# Patient Record
Sex: MALE | Race: WHITE | Employment: OTHER | URBAN - METROPOLITAN AREA
[De-identification: names, ages, dates, MRNs, and addresses within clinical notes are randomized per-mention and may not be internally consistent; named-entity substitution may affect disease eponyms.]

---

## 2017-03-06 RX ORDER — ROSUVASTATIN CALCIUM 10 MG/1
TABLET, COATED ORAL
Qty: 30 TABLET | Refills: 11 | Status: SHIPPED | OUTPATIENT
Start: 2017-03-06 | End: 2017-05-02

## 2017-05-02 ENCOUNTER — OFFICE VISIT (OUTPATIENT)
Dept: INTERNAL MEDICINE CLINIC | Age: 82
End: 2017-05-02

## 2017-05-02 VITALS
RESPIRATION RATE: 16 BRPM | DIASTOLIC BLOOD PRESSURE: 60 MMHG | TEMPERATURE: 98.5 F | HEART RATE: 74 BPM | SYSTOLIC BLOOD PRESSURE: 118 MMHG | OXYGEN SATURATION: 93 % | BODY MASS INDEX: 22.33 KG/M2 | WEIGHT: 174 LBS | HEIGHT: 74 IN

## 2017-05-02 DIAGNOSIS — J40 BRONCHITIS: Primary | ICD-10-CM

## 2017-05-02 PROCEDURE — 1123F ACP DISCUSS/DSCN MKR DOCD: CPT | Performed by: INTERNAL MEDICINE

## 2017-05-02 PROCEDURE — 1036F TOBACCO NON-USER: CPT | Performed by: INTERNAL MEDICINE

## 2017-05-02 PROCEDURE — G8419 CALC BMI OUT NRM PARAM NOF/U: HCPCS | Performed by: INTERNAL MEDICINE

## 2017-05-02 PROCEDURE — 99213 OFFICE O/P EST LOW 20 MIN: CPT | Performed by: INTERNAL MEDICINE

## 2017-05-02 PROCEDURE — 4040F PNEUMOC VAC/ADMIN/RCVD: CPT | Performed by: INTERNAL MEDICINE

## 2017-05-02 PROCEDURE — G8427 DOCREV CUR MEDS BY ELIG CLIN: HCPCS | Performed by: INTERNAL MEDICINE

## 2017-05-02 RX ORDER — DOXYCYCLINE HYCLATE 100 MG
100 TABLET ORAL 2 TIMES DAILY
Qty: 20 TABLET | Refills: 0 | Status: SHIPPED | OUTPATIENT
Start: 2017-05-02 | End: 2017-05-12

## 2017-05-02 RX ORDER — AZITHROMYCIN 250 MG/1
TABLET, FILM COATED ORAL
Qty: 1 PACKET | Refills: 0 | Status: SHIPPED | OUTPATIENT
Start: 2017-05-02 | End: 2017-05-02 | Stop reason: SINTOL

## 2017-05-02 ASSESSMENT — ENCOUNTER SYMPTOMS
COUGH: 1
CHEST TIGHTNESS: 0
TROUBLE SWALLOWING: 1
WHEEZING: 0
SHORTNESS OF BREATH: 0
SORE THROAT: 1

## 2017-05-05 RX ORDER — VALSARTAN AND HYDROCHLOROTHIAZIDE 160; 25 MG/1; MG/1
TABLET ORAL
Qty: 30 TABLET | Refills: 2 | Status: SHIPPED | OUTPATIENT
Start: 2017-05-05 | End: 2017-08-08 | Stop reason: SDUPTHER

## 2017-08-08 RX ORDER — VALSARTAN AND HYDROCHLOROTHIAZIDE 160; 25 MG/1; MG/1
TABLET ORAL
Qty: 30 TABLET | Refills: 5 | Status: SHIPPED | OUTPATIENT
Start: 2017-08-08 | End: 2018-02-02 | Stop reason: SDUPTHER

## 2017-08-08 RX ORDER — VALSARTAN AND HYDROCHLOROTHIAZIDE 160; 25 MG/1; MG/1
TABLET ORAL
Qty: 90 TABLET | Refills: 1 | Status: SHIPPED | OUTPATIENT
Start: 2017-08-08 | End: 2017-10-23

## 2017-09-25 ENCOUNTER — NURSE ONLY (OUTPATIENT)
Dept: INTERNAL MEDICINE CLINIC | Age: 82
End: 2017-09-25

## 2017-09-25 DIAGNOSIS — Z23 INFLUENZA VACCINE ADMINISTERED: Primary | ICD-10-CM

## 2017-09-25 PROCEDURE — G0008 ADMIN INFLUENZA VIRUS VAC: HCPCS | Performed by: INTERNAL MEDICINE

## 2017-09-25 PROCEDURE — 90662 IIV NO PRSV INCREASED AG IM: CPT | Performed by: INTERNAL MEDICINE

## 2017-10-23 ENCOUNTER — OFFICE VISIT (OUTPATIENT)
Dept: INTERNAL MEDICINE CLINIC | Age: 82
End: 2017-10-23

## 2017-10-23 VITALS
TEMPERATURE: 98.5 F | WEIGHT: 172 LBS | SYSTOLIC BLOOD PRESSURE: 136 MMHG | HEART RATE: 82 BPM | BODY MASS INDEX: 22.08 KG/M2 | DIASTOLIC BLOOD PRESSURE: 82 MMHG | RESPIRATION RATE: 16 BRPM

## 2017-10-23 DIAGNOSIS — K59.01 SLOW TRANSIT CONSTIPATION: Primary | ICD-10-CM

## 2017-10-23 PROCEDURE — 1036F TOBACCO NON-USER: CPT | Performed by: INTERNAL MEDICINE

## 2017-10-23 PROCEDURE — G8484 FLU IMMUNIZE NO ADMIN: HCPCS | Performed by: INTERNAL MEDICINE

## 2017-10-23 PROCEDURE — 4040F PNEUMOC VAC/ADMIN/RCVD: CPT | Performed by: INTERNAL MEDICINE

## 2017-10-23 PROCEDURE — G8420 CALC BMI NORM PARAMETERS: HCPCS | Performed by: INTERNAL MEDICINE

## 2017-10-23 PROCEDURE — G8427 DOCREV CUR MEDS BY ELIG CLIN: HCPCS | Performed by: INTERNAL MEDICINE

## 2017-10-23 PROCEDURE — 1123F ACP DISCUSS/DSCN MKR DOCD: CPT | Performed by: INTERNAL MEDICINE

## 2017-10-23 PROCEDURE — 99213 OFFICE O/P EST LOW 20 MIN: CPT | Performed by: INTERNAL MEDICINE

## 2017-10-23 ASSESSMENT — PATIENT HEALTH QUESTIONNAIRE - PHQ9
1. LITTLE INTEREST OR PLEASURE IN DOING THINGS: 0
2. FEELING DOWN, DEPRESSED OR HOPELESS: 0
SUM OF ALL RESPONSES TO PHQ QUESTIONS 1-9: 0
SUM OF ALL RESPONSES TO PHQ9 QUESTIONS 1 & 2: 0

## 2017-10-23 ASSESSMENT — ENCOUNTER SYMPTOMS
ABDOMINAL PAIN: 0
DIARRHEA: 0
CONSTIPATION: 1
ABDOMINAL DISTENTION: 0
ANAL BLEEDING: 0
RECTAL PAIN: 0
BLOOD IN STOOL: 0
NAUSEA: 0

## 2017-10-23 NOTE — PROGRESS NOTES
Subjective:      Patient ID: Cristobal Cortez is a 80 y.o. male. [facility]   Robbin Hackett is here for constipation. Constipation - for 6 weeks. Last colonoscopy in 9/14 and \"clear'. Has some diverticulosis. Benefiber and Miralax stopped because of \"gas\". Straining to have bowel movement. Now eating more dietary fiber and some better. Review of Systems   Gastrointestinal: Positive for constipation. Negative for abdominal distention, abdominal pain, anal bleeding, blood in stool, diarrhea, nausea and rectal pain. Objective:   Physical Exam   Abdominal: Soft. Bowel sounds are normal. He exhibits no distension and no mass. There is no tenderness. There is no rebound and no guarding. Assessment:      1. Slow transit constipation            Plan:      Ana María Blank was seen today for constipation. Diagnoses and all orders for this visit:    Slow transit constipation       - Continue high fiber; Linzess if no improvement.

## 2018-01-10 RX ORDER — ROSUVASTATIN CALCIUM 10 MG/1
TABLET, COATED ORAL
Qty: 90 TABLET | Refills: 3 | Status: SHIPPED | OUTPATIENT
Start: 2018-01-10 | End: 2018-02-02 | Stop reason: SDUPTHER

## 2018-02-27 ENCOUNTER — OFFICE VISIT (OUTPATIENT)
Dept: INTERNAL MEDICINE CLINIC | Age: 83
End: 2018-02-27

## 2018-02-27 VITALS
DIASTOLIC BLOOD PRESSURE: 70 MMHG | RESPIRATION RATE: 14 BRPM | SYSTOLIC BLOOD PRESSURE: 134 MMHG | OXYGEN SATURATION: 98 % | WEIGHT: 170 LBS | BODY MASS INDEX: 21.82 KG/M2 | HEART RATE: 80 BPM | HEIGHT: 74 IN

## 2018-02-27 DIAGNOSIS — E78.2 MIXED HYPERLIPIDEMIA: ICD-10-CM

## 2018-02-27 DIAGNOSIS — H61.23 CERUMEN DEBRIS ON TYMPANIC MEMBRANE OF BOTH EARS: ICD-10-CM

## 2018-02-27 DIAGNOSIS — I10 ESSENTIAL HYPERTENSION: Primary | ICD-10-CM

## 2018-02-27 DIAGNOSIS — F41.1 GENERALIZED ANXIETY DISORDER: ICD-10-CM

## 2018-02-27 LAB
A/G RATIO: 1.9 (ref 1.1–2.2)
ALBUMIN SERPL-MCNC: 4.6 G/DL (ref 3.4–5)
ALP BLD-CCNC: 63 U/L (ref 40–129)
ALT SERPL-CCNC: 18 U/L (ref 10–40)
ANION GAP SERPL CALCULATED.3IONS-SCNC: 16 MMOL/L (ref 3–16)
AST SERPL-CCNC: 18 U/L (ref 15–37)
BILIRUB SERPL-MCNC: 0.9 MG/DL (ref 0–1)
BUN BLDV-MCNC: 14 MG/DL (ref 7–20)
CALCIUM SERPL-MCNC: 9.3 MG/DL (ref 8.3–10.6)
CHLORIDE BLD-SCNC: 103 MMOL/L (ref 99–110)
CHOLESTEROL, TOTAL: 144 MG/DL (ref 0–199)
CO2: 24 MMOL/L (ref 21–32)
CREAT SERPL-MCNC: 1.1 MG/DL (ref 0.8–1.3)
GFR AFRICAN AMERICAN: >60
GFR NON-AFRICAN AMERICAN: >60
GLOBULIN: 2.4 G/DL
GLUCOSE BLD-MCNC: 106 MG/DL (ref 70–99)
HDLC SERPL-MCNC: 43 MG/DL (ref 40–60)
LDL CHOLESTEROL CALCULATED: 81 MG/DL
POTASSIUM SERPL-SCNC: 4.3 MMOL/L (ref 3.5–5.1)
SODIUM BLD-SCNC: 143 MMOL/L (ref 136–145)
TOTAL PROTEIN: 7 G/DL (ref 6.4–8.2)
TRIGL SERPL-MCNC: 101 MG/DL (ref 0–150)
VLDLC SERPL CALC-MCNC: 20 MG/DL

## 2018-02-27 PROCEDURE — G8420 CALC BMI NORM PARAMETERS: HCPCS | Performed by: INTERNAL MEDICINE

## 2018-02-27 PROCEDURE — G8484 FLU IMMUNIZE NO ADMIN: HCPCS | Performed by: INTERNAL MEDICINE

## 2018-02-27 PROCEDURE — 99214 OFFICE O/P EST MOD 30 MIN: CPT | Performed by: INTERNAL MEDICINE

## 2018-02-27 PROCEDURE — 4040F PNEUMOC VAC/ADMIN/RCVD: CPT | Performed by: INTERNAL MEDICINE

## 2018-02-27 PROCEDURE — 1036F TOBACCO NON-USER: CPT | Performed by: INTERNAL MEDICINE

## 2018-02-27 PROCEDURE — 36415 COLL VENOUS BLD VENIPUNCTURE: CPT | Performed by: INTERNAL MEDICINE

## 2018-02-27 PROCEDURE — G8427 DOCREV CUR MEDS BY ELIG CLIN: HCPCS | Performed by: INTERNAL MEDICINE

## 2018-02-27 PROCEDURE — 1123F ACP DISCUSS/DSCN MKR DOCD: CPT | Performed by: INTERNAL MEDICINE

## 2018-02-27 ASSESSMENT — ENCOUNTER SYMPTOMS
RESPIRATORY NEGATIVE: 1
GASTROINTESTINAL NEGATIVE: 1

## 2018-02-27 NOTE — PROGRESS NOTES
Subjective:      Patient ID: Anika Bryant is a 80 y.o. male. HPI  Here for follow-up of HTN, HL and anxiety disorder. HTN - The patient denies any chest pain, shortness or breath, headaches or palpitations. There is no lower extremity edema. Continues to use the medication as prescribed (Kamran Black) and is having no side effects. HL - on Crestor and no side effects. No recent lipid profile. Anxiety disorder - on no current medications. Overall nervousness seems better. Review of Systems   Constitutional: Negative. HENT: Positive for hearing loss. Respiratory: Negative. Cardiovascular: Negative. Gastrointestinal: Negative. Genitourinary:        Sees urology annually. PSA < 1. Musculoskeletal: Negative. Neurological: Negative. Psychiatric/Behavioral: The patient is nervous/anxious. 14 point ros otherwise negative. Objective:   Physical Exam   Constitutional: He is oriented to person, place, and time. He appears well-developed and well-nourished. HENT:   Cerumen bilateral.   Eyes: EOM are normal. Pupils are equal, round, and reactive to light. Neck: No JVD present. Cardiovascular: Normal rate, regular rhythm and normal heart sounds. Pulmonary/Chest: Effort normal and breath sounds normal. No respiratory distress. He has no wheezes. He has no rales. Abdominal: Soft. Bowel sounds are normal. He exhibits no distension. There is no tenderness. Musculoskeletal: He exhibits no edema. Neurological: He is alert and oriented to person, place, and time. Skin: Skin is warm and dry. Assessment:      1. Essential hypertension    2. Mixed hyperlipidemia    3. Generalized anxiety disorder    4. Cerumen debris on tympanic membrane of both ears            Plan:      Sheila Mann was seen today for medication check.     Diagnoses and all orders for this visit:    Essential hypertension, controlled        - Continue Valsartan-Hctz  -     Comprehensive Metabolic Panel    Mixed hyperlipidemia        - Continue Crestor  -     Lipid Panel    Generalized anxiety disorder, controlled        - No treatment needed. Cerumen debris on tympanic membrane of both ears        - Partially removed with curette.

## 2018-03-02 RX ORDER — VALSARTAN AND HYDROCHLOROTHIAZIDE 160; 25 MG/1; MG/1
TABLET ORAL
Qty: 30 TABLET | Refills: 11 | Status: SHIPPED | OUTPATIENT
Start: 2018-03-02 | End: 2018-11-26 | Stop reason: ALTCHOICE

## 2018-09-25 ENCOUNTER — NURSE ONLY (OUTPATIENT)
Dept: INTERNAL MEDICINE CLINIC | Age: 83
End: 2018-09-25
Payer: MEDICARE

## 2018-09-25 DIAGNOSIS — R73.09 ELEVATED GLUCOSE: Primary | ICD-10-CM

## 2018-09-25 LAB
ANION GAP SERPL CALCULATED.3IONS-SCNC: 15 MMOL/L (ref 3–16)
BUN BLDV-MCNC: 17 MG/DL (ref 7–20)
CALCIUM SERPL-MCNC: 9.5 MG/DL (ref 8.3–10.6)
CHLORIDE BLD-SCNC: 103 MMOL/L (ref 99–110)
CO2: 22 MMOL/L (ref 21–32)
CREAT SERPL-MCNC: 1.1 MG/DL (ref 0.8–1.3)
GFR AFRICAN AMERICAN: >60
GFR NON-AFRICAN AMERICAN: >60
GLUCOSE BLD-MCNC: 95 MG/DL (ref 70–99)
POTASSIUM SERPL-SCNC: 4.8 MMOL/L (ref 3.5–5.1)
SODIUM BLD-SCNC: 140 MMOL/L (ref 136–145)

## 2018-09-25 PROCEDURE — 90662 IIV NO PRSV INCREASED AG IM: CPT | Performed by: INTERNAL MEDICINE

## 2018-09-25 PROCEDURE — 36415 COLL VENOUS BLD VENIPUNCTURE: CPT | Performed by: INTERNAL MEDICINE

## 2018-09-25 PROCEDURE — G0008 ADMIN INFLUENZA VIRUS VAC: HCPCS | Performed by: INTERNAL MEDICINE

## 2018-10-07 ENCOUNTER — APPOINTMENT (OUTPATIENT)
Dept: GENERAL RADIOLOGY | Age: 83
DRG: 641 | End: 2018-10-07
Payer: MEDICARE

## 2018-10-07 ENCOUNTER — HOSPITAL ENCOUNTER (INPATIENT)
Age: 83
LOS: 2 days | Discharge: HOME | DRG: 641 | End: 2018-10-09
Attending: EMERGENCY MEDICINE | Admitting: INTERNAL MEDICINE
Payer: MEDICARE

## 2018-10-07 ENCOUNTER — APPOINTMENT (OUTPATIENT)
Dept: CT IMAGING | Age: 83
DRG: 641 | End: 2018-10-07
Payer: MEDICARE

## 2018-10-07 DIAGNOSIS — R55 PRE-SYNCOPE: Primary | ICD-10-CM

## 2018-10-07 DIAGNOSIS — I49.3 PVC (PREMATURE VENTRICULAR CONTRACTION): ICD-10-CM

## 2018-10-07 DIAGNOSIS — R20.0 LEFT LEG NUMBNESS: ICD-10-CM

## 2018-10-07 LAB
A/G RATIO: 1.7 (ref 1.1–2.2)
ALBUMIN SERPL-MCNC: 4.2 G/DL (ref 3.4–5)
ALP BLD-CCNC: 54 U/L (ref 40–129)
ALT SERPL-CCNC: 21 U/L (ref 10–40)
ANION GAP SERPL CALCULATED.3IONS-SCNC: 16 MMOL/L (ref 3–16)
AST SERPL-CCNC: 21 U/L (ref 15–37)
BASOPHILS ABSOLUTE: 0.1 K/UL (ref 0–0.2)
BASOPHILS RELATIVE PERCENT: 1 %
BILIRUB SERPL-MCNC: 0.5 MG/DL (ref 0–1)
BILIRUBIN URINE: NEGATIVE
BLOOD, URINE: NEGATIVE
BUN BLDV-MCNC: 18 MG/DL (ref 7–20)
CALCIUM SERPL-MCNC: 9.1 MG/DL (ref 8.3–10.6)
CHLORIDE BLD-SCNC: 102 MMOL/L (ref 99–110)
CLARITY: CLEAR
CO2: 21 MMOL/L (ref 21–32)
COLOR: YELLOW
CREAT SERPL-MCNC: 1.2 MG/DL (ref 0.8–1.3)
EOSINOPHILS ABSOLUTE: 0.1 K/UL (ref 0–0.6)
EOSINOPHILS RELATIVE PERCENT: 1.8 %
GFR AFRICAN AMERICAN: >60
GFR NON-AFRICAN AMERICAN: 58
GLOBULIN: 2.5 G/DL
GLUCOSE BLD-MCNC: 134 MG/DL (ref 70–99)
GLUCOSE URINE: NEGATIVE MG/DL
HCT VFR BLD CALC: 37.8 % (ref 40.5–52.5)
HEMOGLOBIN: 12.8 G/DL (ref 13.5–17.5)
KETONES, URINE: NEGATIVE MG/DL
LEUKOCYTE ESTERASE, URINE: NEGATIVE
LYMPHOCYTES ABSOLUTE: 2 K/UL (ref 1–5.1)
LYMPHOCYTES RELATIVE PERCENT: 29.4 %
MAGNESIUM: 1.8 MG/DL (ref 1.8–2.4)
MCH RBC QN AUTO: 32.8 PG (ref 26–34)
MCHC RBC AUTO-ENTMCNC: 34 G/DL (ref 31–36)
MCV RBC AUTO: 96.4 FL (ref 80–100)
MICROSCOPIC EXAMINATION: NORMAL
MONOCYTES ABSOLUTE: 0.6 K/UL (ref 0–1.3)
MONOCYTES RELATIVE PERCENT: 8.3 %
NEUTROPHILS ABSOLUTE: 4 K/UL (ref 1.7–7.7)
NEUTROPHILS RELATIVE PERCENT: 59.5 %
NITRITE, URINE: NEGATIVE
PDW BLD-RTO: 13.5 % (ref 12.4–15.4)
PH UA: 7
PLATELET # BLD: 121 K/UL (ref 135–450)
PMV BLD AUTO: 9.7 FL (ref 5–10.5)
POTASSIUM REFLEX MAGNESIUM: 4.2 MMOL/L (ref 3.5–5.1)
PRO-BNP: 218 PG/ML (ref 0–449)
PROTEIN UA: NEGATIVE MG/DL
RBC # BLD: 3.92 M/UL (ref 4.2–5.9)
SODIUM BLD-SCNC: 139 MMOL/L (ref 136–145)
SPECIFIC GRAVITY UA: 1.01
TOTAL PROTEIN: 6.7 G/DL (ref 6.4–8.2)
TROPONIN: 0.01 NG/ML
URINE REFLEX TO CULTURE: NORMAL
URINE TYPE: NORMAL
UROBILINOGEN, URINE: 0.2 E.U./DL
WBC # BLD: 6.7 K/UL (ref 4–11)

## 2018-10-07 PROCEDURE — 70450 CT HEAD/BRAIN W/O DYE: CPT

## 2018-10-07 PROCEDURE — 1200000000 HC SEMI PRIVATE

## 2018-10-07 PROCEDURE — 71045 X-RAY EXAM CHEST 1 VIEW: CPT

## 2018-10-07 PROCEDURE — 93010 ELECTROCARDIOGRAM REPORT: CPT | Performed by: INTERNAL MEDICINE

## 2018-10-07 PROCEDURE — 80053 COMPREHEN METABOLIC PANEL: CPT

## 2018-10-07 PROCEDURE — 71250 CT THORAX DX C-: CPT

## 2018-10-07 PROCEDURE — 2580000003 HC RX 258: Performed by: INTERNAL MEDICINE

## 2018-10-07 PROCEDURE — 83735 ASSAY OF MAGNESIUM: CPT

## 2018-10-07 PROCEDURE — 83880 ASSAY OF NATRIURETIC PEPTIDE: CPT

## 2018-10-07 PROCEDURE — 93005 ELECTROCARDIOGRAM TRACING: CPT | Performed by: EMERGENCY MEDICINE

## 2018-10-07 PROCEDURE — 81003 URINALYSIS AUTO W/O SCOPE: CPT

## 2018-10-07 PROCEDURE — 6370000000 HC RX 637 (ALT 250 FOR IP): Performed by: INTERNAL MEDICINE

## 2018-10-07 PROCEDURE — 85025 COMPLETE CBC W/AUTO DIFF WBC: CPT

## 2018-10-07 PROCEDURE — 36415 COLL VENOUS BLD VENIPUNCTURE: CPT

## 2018-10-07 PROCEDURE — 99285 EMERGENCY DEPT VISIT HI MDM: CPT

## 2018-10-07 PROCEDURE — 84484 ASSAY OF TROPONIN QUANT: CPT

## 2018-10-07 PROCEDURE — 74176 CT ABD & PELVIS W/O CONTRAST: CPT

## 2018-10-07 RX ORDER — ONDANSETRON 2 MG/ML
4 INJECTION INTRAMUSCULAR; INTRAVENOUS EVERY 6 HOURS PRN
Status: DISCONTINUED | OUTPATIENT
Start: 2018-10-07 | End: 2018-10-09 | Stop reason: HOSPADM

## 2018-10-07 RX ORDER — SODIUM CHLORIDE 0.9 % (FLUSH) 0.9 %
10 SYRINGE (ML) INJECTION PRN
Status: DISCONTINUED | OUTPATIENT
Start: 2018-10-07 | End: 2018-10-07 | Stop reason: SDUPTHER

## 2018-10-07 RX ORDER — PANTOPRAZOLE SODIUM 40 MG/1
40 TABLET, DELAYED RELEASE ORAL
Status: DISCONTINUED | OUTPATIENT
Start: 2018-10-08 | End: 2018-10-09 | Stop reason: HOSPADM

## 2018-10-07 RX ORDER — ASPIRIN 325 MG
325 TABLET ORAL DAILY
Status: DISCONTINUED | OUTPATIENT
Start: 2018-10-08 | End: 2018-10-09 | Stop reason: HOSPADM

## 2018-10-07 RX ORDER — SODIUM CHLORIDE 0.9 % (FLUSH) 0.9 %
10 SYRINGE (ML) INJECTION EVERY 12 HOURS SCHEDULED
Status: DISCONTINUED | OUTPATIENT
Start: 2018-10-07 | End: 2018-10-07 | Stop reason: SDUPTHER

## 2018-10-07 RX ORDER — SODIUM CHLORIDE 0.9 % (FLUSH) 0.9 %
10 SYRINGE (ML) INJECTION PRN
Status: DISCONTINUED | OUTPATIENT
Start: 2018-10-07 | End: 2018-10-09 | Stop reason: HOSPADM

## 2018-10-07 RX ORDER — ONDANSETRON 2 MG/ML
4 INJECTION INTRAMUSCULAR; INTRAVENOUS EVERY 6 HOURS PRN
Status: DISCONTINUED | OUTPATIENT
Start: 2018-10-07 | End: 2018-10-07 | Stop reason: SDUPTHER

## 2018-10-07 RX ORDER — SODIUM CHLORIDE 0.9 % (FLUSH) 0.9 %
10 SYRINGE (ML) INJECTION EVERY 12 HOURS SCHEDULED
Status: DISCONTINUED | OUTPATIENT
Start: 2018-10-07 | End: 2018-10-09 | Stop reason: HOSPADM

## 2018-10-07 RX ORDER — ATORVASTATIN CALCIUM 40 MG/1
40 TABLET, FILM COATED ORAL NIGHTLY
Status: DISCONTINUED | OUTPATIENT
Start: 2018-10-07 | End: 2018-10-09 | Stop reason: HOSPADM

## 2018-10-07 RX ORDER — VALSARTAN AND HYDROCHLOROTHIAZIDE 160; 25 MG/1; MG/1
1 TABLET ORAL DAILY
Status: DISCONTINUED | OUTPATIENT
Start: 2018-10-08 | End: 2018-10-09 | Stop reason: HOSPADM

## 2018-10-07 RX ADMIN — ATORVASTATIN CALCIUM 40 MG: 40 TABLET, FILM COATED ORAL at 22:55

## 2018-10-07 RX ADMIN — Medication 10 ML: at 22:56

## 2018-10-07 ASSESSMENT — PAIN DESCRIPTION - ORIENTATION: ORIENTATION: LEFT

## 2018-10-07 ASSESSMENT — PAIN DESCRIPTION - LOCATION: LOCATION: SHOULDER

## 2018-10-07 ASSESSMENT — PAIN DESCRIPTION - PAIN TYPE: TYPE: CHRONIC PAIN

## 2018-10-07 ASSESSMENT — PAIN SCALES - GENERAL: PAINLEVEL_OUTOF10: 4

## 2018-10-07 NOTE — ED PROVIDER NOTES
1545 10/07/18 1600 10/07/18 1615 10/07/18 1630   BP: 133/64 (!) 128/56 (!) 127/59 125/69   Pulse: 85 82 81 80   Resp: 18 20 18 14   Temp:       TempSrc:       SpO2: 100% 100% 100% 100%   Weight:       Height:             MDM  80-year-old male presents to the ER with complaint of intermittent episodes of lightheadedness, left leg numbness, palpitations. Vital signs stable. Physical exam as above. His neurological exam is intact at this time. He does have frequent PVCs on the monitor and his EKG. No other ischemic changes. Will get cardiac workup, CT head and admit for further presyncopal evaluation and evaluation of left leg numbness. Patient's lab work within normal limits. He continues to have occasional PVCs on the monitor. CT head within normal limits. Chest x-ray with concern for pneumothorax or pneumoperitoneum. CT chest and abdomen ordered and no acute abnormality. No pneumothorax or pneumoperitoneum. Discussed with patient and patient's primary care physician. We'll transfer to [facility] for inpatient admission and evaluation of PVCs and presyncope. CONSULTS:  None      FINAL IMPRESSION      1. Pre-syncope    2. Left leg numbness    3. PVC (premature ventricular contraction)          DISPOSITION/PLAN   DISPOSITION Decision To Transfer 10/07/2018 04:22:06 PM      PATIENT REFERRED TO:  No follow-up provider specified.     DISCHARGE MEDICATIONS:  New Prescriptions    No medications on file          (Please note that portions of this note were completed with a voice recognition program.  Efforts were made to edit the dictations but occasionally words are mis-transcribed.)    Xiomara Ferris DO (electronically signed)  Attending Emergency Physician      Xiomara Ferris DO  10/07/18 [id number]

## 2018-10-08 ENCOUNTER — TELEPHONE (OUTPATIENT)
Dept: CARDIOLOGY CLINIC | Age: 83
End: 2018-10-08

## 2018-10-08 DIAGNOSIS — R55 PRE-SYNCOPE: Primary | ICD-10-CM

## 2018-10-08 LAB
ANION GAP SERPL CALCULATED.3IONS-SCNC: 13 MMOL/L (ref 3–16)
BASOPHILS ABSOLUTE: 0 K/UL (ref 0–0.2)
BASOPHILS RELATIVE PERCENT: 0.5 %
BUN BLDV-MCNC: 14 MG/DL (ref 7–20)
CALCIUM SERPL-MCNC: 9 MG/DL (ref 8.3–10.6)
CHLORIDE BLD-SCNC: 100 MMOL/L (ref 99–110)
CO2: 22 MMOL/L (ref 21–32)
CREAT SERPL-MCNC: 1 MG/DL (ref 0.8–1.3)
EOSINOPHILS ABSOLUTE: 0 K/UL (ref 0–0.6)
EOSINOPHILS RELATIVE PERCENT: 0.8 %
FERRITIN: 127.2 NG/ML (ref 30–400)
GFR AFRICAN AMERICAN: >60
GFR NON-AFRICAN AMERICAN: >60
GLUCOSE BLD-MCNC: 186 MG/DL (ref 70–99)
HCT VFR BLD CALC: 41.2 % (ref 40.5–52.5)
HEMOGLOBIN: 13.5 G/DL (ref 13.5–17.5)
IRON SATURATION: 28 % (ref 20–50)
IRON: 72 UG/DL (ref 59–158)
LYMPHOCYTES ABSOLUTE: 1.1 K/UL (ref 1–5.1)
LYMPHOCYTES RELATIVE PERCENT: 18.5 %
MAGNESIUM: 1.8 MG/DL (ref 1.8–2.4)
MCH RBC QN AUTO: 31.8 PG (ref 26–34)
MCHC RBC AUTO-ENTMCNC: 32.9 G/DL (ref 31–36)
MCV RBC AUTO: 96.9 FL (ref 80–100)
MONOCYTES ABSOLUTE: 0.3 K/UL (ref 0–1.3)
MONOCYTES RELATIVE PERCENT: 4.6 %
NEUTROPHILS ABSOLUTE: 4.6 K/UL (ref 1.7–7.7)
NEUTROPHILS RELATIVE PERCENT: 75.6 %
PDW BLD-RTO: 13.2 % (ref 12.4–15.4)
PLATELET # BLD: 116 K/UL (ref 135–450)
PMV BLD AUTO: 9.4 FL (ref 5–10.5)
POTASSIUM REFLEX MAGNESIUM: 3.5 MMOL/L (ref 3.5–5.1)
PROSTATE SPECIFIC ANTIGEN: 0.67 NG/ML (ref 0–4)
RBC # BLD: 4.25 M/UL (ref 4.2–5.9)
SODIUM BLD-SCNC: 135 MMOL/L (ref 136–145)
T4 FREE: 1.3 NG/DL (ref 0.9–1.8)
TOTAL IRON BINDING CAPACITY: 260 UG/DL (ref 260–445)
TSH REFLEX: 5.28 UIU/ML (ref 0.27–4.2)
WBC # BLD: 6.1 K/UL (ref 4–11)

## 2018-10-08 PROCEDURE — 1200000000 HC SEMI PRIVATE

## 2018-10-08 PROCEDURE — 83550 IRON BINDING TEST: CPT

## 2018-10-08 PROCEDURE — 99223 1ST HOSP IP/OBS HIGH 75: CPT | Performed by: INTERNAL MEDICINE

## 2018-10-08 PROCEDURE — 83735 ASSAY OF MAGNESIUM: CPT

## 2018-10-08 PROCEDURE — 84439 ASSAY OF FREE THYROXINE: CPT

## 2018-10-08 PROCEDURE — 94760 N-INVAS EAR/PLS OXIMETRY 1: CPT

## 2018-10-08 PROCEDURE — 84443 ASSAY THYROID STIM HORMONE: CPT

## 2018-10-08 PROCEDURE — 2580000003 HC RX 258: Performed by: INTERNAL MEDICINE

## 2018-10-08 PROCEDURE — 80048 BASIC METABOLIC PNL TOTAL CA: CPT

## 2018-10-08 PROCEDURE — 84153 ASSAY OF PSA TOTAL: CPT

## 2018-10-08 PROCEDURE — 82728 ASSAY OF FERRITIN: CPT

## 2018-10-08 PROCEDURE — 6360000002 HC RX W HCPCS: Performed by: INTERNAL MEDICINE

## 2018-10-08 PROCEDURE — 83540 ASSAY OF IRON: CPT

## 2018-10-08 PROCEDURE — 36415 COLL VENOUS BLD VENIPUNCTURE: CPT

## 2018-10-08 PROCEDURE — 6370000000 HC RX 637 (ALT 250 FOR IP): Performed by: INTERNAL MEDICINE

## 2018-10-08 PROCEDURE — 85025 COMPLETE CBC W/AUTO DIFF WBC: CPT

## 2018-10-08 RX ADMIN — Medication 10 ML: at 10:07

## 2018-10-08 RX ADMIN — Medication 10 ML: at 21:18

## 2018-10-08 RX ADMIN — ASPIRIN 325 MG ORAL TABLET 325 MG: 325 PILL ORAL at 10:07

## 2018-10-08 RX ADMIN — VALSARTAN AND HYDROCHLOROTHIAZIDE 1 TABLET: 25; 160 TABLET, FILM COATED ORAL at 10:07

## 2018-10-08 RX ADMIN — ATORVASTATIN CALCIUM 40 MG: 40 TABLET, FILM COATED ORAL at 21:18

## 2018-10-08 RX ADMIN — PANTOPRAZOLE SODIUM 40 MG: 40 TABLET, DELAYED RELEASE ORAL at 06:26

## 2018-10-08 RX ADMIN — ENOXAPARIN SODIUM 40 MG: 40 INJECTION SUBCUTANEOUS at 10:07

## 2018-10-08 ASSESSMENT — PAIN SCALES - GENERAL: PAINLEVEL_OUTOF10: 0

## 2018-10-08 NOTE — CONSULTS
[address]   Cardiac Electrophysiology Consultation   Date: 10/8/2018  Admit Date:  10/7/2018  Reason for Consultation: presyncope  Consult Requesting Physician: Niko Velazco MD     Chief Complaint   Patient presents with    Fatigue     light headed and weak for past weak, just not \"feeling right\"    Irregular Heart Beat     stated his heart beat funny last Sunday while at [employer]. Did play gold Thursday. Had to leave [employer] again today due to funny heart beat and feeling faint     HPI: Wilma Solo is a 80 y.o. male who presents to the emergency department With complaint of episodes of lightheadedness, almost passing out, irregular heartbeat for the past 3 weeks. Also reporting intermittent episodes of left leg numbness. States that a few weeks ago he was sitting in a chair in his left leg went numb. He stood up and felt like his left leg was even there. The symptoms have come and gone. Also reports last Sunday and this Sunday [employer] feeling lightheaded like he may pass out. He denies loss of consciousness, headache, head trauma. These episodes have been occurring since the last 3 weeks and always occurs in the context of a position change, from sitting to standing. Never occurs while sitting or lying down. He drinks 3 cups of water per day, at most. No new changes to his medications. Has been on valsartan for at least the last 2-3 years without changes in dosage. Denies any chest pain, shortness of breath. Does state he feels his heart having an irregular beat occasionally. Past Medical History:   Diagnosis Date    Collapsed lung     H/O    Hyperlipidemia     Hypertension     Leg fracture, right     H/O    Prostate cancer ([facility] [address]         Past Surgical History:   Procedure Laterality Date    APPENDECTOMY      TONSILLECTOMY         Allergies   Allergen Reactions    Cephalosporins     Cefadroxil Hives       Social History:  Reviewed. reports that he has quit smoking. · Pulmonary/Chest: Bilateral respiratory sounds. No wheezes, No rhonchi. · Abdominal: Soft. Bowel sounds present. No distension, No tenderness. · Musculoskeletal: No tenderness. No edema    · Lymphadenopathy: Has no cervical adenopathy. · Neurological: Alert and oriented. Cranial nerve appears intact, No Gross deficit   · Skin: Skin is warm and dry. No rash noted. · Psychiatric: Has a normal behavior       Labs:  Reviewed. Recent Labs      10/07/18   1520  10/08/18   1020   NA  139  135*   K  4.2  3.5   CL  102  100   CO2  21  22   BUN  18  14   CREATININE  1.2  1.0     Recent Labs      10/07/18   1520  10/08/18   1020   WBC  6.7  6.1   HGB  12.8*  13.5   HCT  37.8*  41.2   MCV  96.4  96.9   PLT  121*  116*     Lab Results   Component Value Date    TROPONINI 0.01 10/07/2018     No results found for: BNP  No results found for: PROTIME, INR  Lab Results   Component Value Date    CHOL 144 02/27/2018    HDL 43 02/27/2018    HDL 48 08/05/2011    TRIG 101 02/27/2018       Diagnostic and imaging results reviewed. ECG: sinus rhythm with frequent PVCs (monomorphic) with v-rates 70's. No ST-T wave changes  Echo: (ordered)  Cath: n/a    I independently reviewed the ECG and telemetry.      Scheduled Meds:   sodium chloride flush  10 mL Intravenous 2 times per day    aspirin  325 mg Oral Daily    pantoprazole  40 mg Oral QAM AC    atorvastatin  40 mg Oral Nightly    valsartan-hydrochlorothiazide  1 tablet Oral Daily    enoxaparin  40 mg Subcutaneous Daily     Continuous Infusions:  PRN Meds:.ondansetron, sodium chloride flush, magnesium hydroxide     Assessment:   Patient Active Problem List    Diagnosis Date Noted    Pre-syncope 10/07/2018    Hyperlipidemia 08/22/2012    Anxiety disorder 08/22/2012    HTN (hypertension) 08/22/2012    Prostate cancer [facility])       Active Hospital Problems    Diagnosis Date Noted    Pre-syncope [R55] 10/07/2018    Hyperlipidemia [E78.5] 08/22/2012    Anxiety disorder

## 2018-10-08 NOTE — H&P
[address]                             HISTORY AND PHYSICAL    PATIENT NAME: Bronwyn Wu                 :         1933  MED REC NO:   [id number]                          ROOM:       QC14  ACCOUNT NO:   [de-identified]                           ADMIT DATE:  10/07/2018  PROVIDER:     Frantz Bustamante MD    CHIEF COMPLAINT:  Near syncope. PERTINENT HISTORY:  This patient is an 80-year-old white male,  relatively healthy with a history of hypertension, hyperlipidemia, and  a previous history of prostate cancer who presented to the hospital  with a number of episodes of near syncope. The patient states that  the episodes began about one week prior to this admission. While at  [employer], he felt lightheaded. This was especially when he would stand. He apparently had to leave [employer] early because of this. He  apparently went home, ate, and felt better. He felt fairly well  throughout the remainder of the week and even played nine holes of  golf in the mid-week time. Then again at the [employer] yesterday and  later in the afternoon, he again felt lightheaded especially when  standing. This was not vertiginous but was more near syncopal.  He  denied any chest pain nor shortness of breath. He denied any  palpitations but when he did check his pulse, he did describe some  periodic irregularity. So because of the recurrence of these episodes, he presented to the  emergency department. In the ER, the patient was found to have sinus  rhythm with frequent premature ventricular contractions. The  remainder of his blood work was within normal limits. CT scanning of  the chest and abdomen was relatively unremarkable other than for  prostatic hypertrophy. His chemistry profile was also normal although  his glucose slightly increased to 134. Potassium was normal.  His  hemoglobin was 12.8.     So because of the

## 2018-10-08 NOTE — PROGRESS NOTES
UTILIZATION REVIEW     Camille VERONICA  10/8/2018,   Ana Dominguez MD    11/12/1933  Chief Complaint   Patient presents with    Fatigue     light headed and weak for past weak, just not \"feeling right\"    Irregular Heart Beat     stated his heart beat funny last Sunday while at [employer]. Did play gold Thursday. Had to leave [employer] again today due to funny heart beat and feeling faint        Active Problems:    Prostate cancer ([facility] [address]    Hyperlipidemia    Anxiety disorder    HTN (hypertension)    Pre-syncope  Resolved Problems:    Syncope and collapse     has a past medical history of Collapsed lung; Hyperlipidemia; Hypertension; Leg fracture, right; and Prostate cancer ([facility] [address] Past Surgical History:     has a past surgical history that includes Appendectomy and Tonsillectomy. ED review    HISTORY OF PRESENT ILLNESS   (Location/Symptom, Timing/Onset, Context/Setting, Quality, Duration, Modifying Factors, Severity)  Note limiting factors. Aimee Trevino is a 80 y.o. male who presents to the emergency department With complaint of episodes of lightheadedness, almost passing out, irregular heartbeat for the past 2 weeks. Also reporting intermittent episodes of left leg numbness. States that a few weeks ago he was sitting in a chair in his left leg went numb. He stood up and felt like his left leg was even there. The symptoms have come and gone. Also reports last Sunday and this Sunday [employer] feeling lightheaded like he may pass out. He denies loss of consciousness, headache, head trauma. Denies any chest pain, shortness of breath. Does state he feels his heart having an irregular beat occasionally.     IMAGING:   Impression:        1. No acute findings in the chest, abdomen or pelvis.  Findings on earlier  chest x-ray are related to colonic interposition. 2. Mild swirling of the mesentery right mid abdomen without associated bowel  obstruction.  Findings are otherwise nonspecific.   3. Prostatic

## 2018-10-08 NOTE — PLAN OF CARE
Problem: Falls - Risk of:  Goal: Will remain free from falls  Will remain free from falls   Outcome: Ongoing    Goal: Absence of physical injury  Absence of physical injury   Outcome: Ongoing      Problem: Infection:  Goal: Will remain free from infection  Will remain free from infection  Outcome: Ongoing      Problem: Safety:  Goal: Free from accidental physical injury  Free from accidental physical injury  Outcome: Ongoing    Goal: Free from intentional harm  Free from intentional harm  Outcome: Ongoing      Problem: Daily Care:  Goal: Daily care needs are met  Daily care needs are met  Outcome: Ongoing      Problem: Pain:  Goal: Patient's pain/discomfort is manageable  Patient's pain/discomfort is manageable  Outcome: Ongoing      Problem: Skin Integrity:  Goal: Skin integrity will stabilize  Skin integrity will stabilize  Outcome: Ongoing      Problem: Discharge Planning:  Goal: Patients continuum of care needs are met  Patients continuum of care needs are met  Outcome: Ongoing

## 2018-10-09 VITALS
RESPIRATION RATE: 16 BRPM | HEART RATE: 90 BPM | HEIGHT: 74 IN | BODY MASS INDEX: 22.58 KG/M2 | OXYGEN SATURATION: 98 % | TEMPERATURE: 97.7 F | WEIGHT: 175.93 LBS | SYSTOLIC BLOOD PRESSURE: 110 MMHG | DIASTOLIC BLOOD PRESSURE: 65 MMHG

## 2018-10-09 LAB
EKG ATRIAL RATE: 88 BPM
EKG DIAGNOSIS: NORMAL
EKG P AXIS: 57 DEGREES
EKG P-R INTERVAL: 152 MS
EKG Q-T INTERVAL: 382 MS
EKG QRS DURATION: 80 MS
EKG QTC CALCULATION (BAZETT): 462 MS
EKG R AXIS: 42 DEGREES
EKG T AXIS: 59 DEGREES
EKG VENTRICULAR RATE: 88 BPM
LV EF: 63 %
LVEF MODALITY: NORMAL

## 2018-10-09 PROCEDURE — 99239 HOSP IP/OBS DSCHRG MGMT >30: CPT | Performed by: INTERNAL MEDICINE

## 2018-10-09 PROCEDURE — 6360000002 HC RX W HCPCS: Performed by: INTERNAL MEDICINE

## 2018-10-09 PROCEDURE — 93306 TTE W/DOPPLER COMPLETE: CPT

## 2018-10-09 PROCEDURE — 94760 N-INVAS EAR/PLS OXIMETRY 1: CPT

## 2018-10-09 PROCEDURE — 6370000000 HC RX 637 (ALT 250 FOR IP): Performed by: INTERNAL MEDICINE

## 2018-10-09 PROCEDURE — 2580000003 HC RX 258: Performed by: INTERNAL MEDICINE

## 2018-10-09 RX ADMIN — Medication 10 ML: at 08:13

## 2018-10-09 RX ADMIN — VALSARTAN AND HYDROCHLOROTHIAZIDE 1 TABLET: 25; 160 TABLET, FILM COATED ORAL at 08:12

## 2018-10-09 RX ADMIN — ENOXAPARIN SODIUM 40 MG: 40 INJECTION SUBCUTANEOUS at 08:13

## 2018-10-09 RX ADMIN — ASPIRIN 325 MG ORAL TABLET 325 MG: 325 PILL ORAL at 08:13

## 2018-10-09 RX ADMIN — PANTOPRAZOLE SODIUM 40 MG: 40 TABLET, DELAYED RELEASE ORAL at 05:44

## 2018-10-09 NOTE — CARE COORDINATION
Discharge Planning:  SW met with patient and introduced self and role of discharge planning   Patient independent in ADLs- no assistance needed   Patient does not use any DME  Patient lives with his wife and states good family and friend support  Patient states no barriers to medication cost/ no home care services   Denies any additional needs at this time   Patient will have transport home from wife   SW left contact information for patient to call with any questions  SW available if d/c needs arise    Electronically signed by DONTA Velasquez on 10/9/2018 at 9:50 AM  [phone]

## 2018-10-10 ENCOUNTER — TELEPHONE (OUTPATIENT)
Dept: INTERNAL MEDICINE CLINIC | Age: 83
End: 2018-10-10

## 2018-10-15 ENCOUNTER — OFFICE VISIT (OUTPATIENT)
Dept: INTERNAL MEDICINE CLINIC | Age: 83
End: 2018-10-15
Payer: MEDICARE

## 2018-10-15 VITALS
RESPIRATION RATE: 14 BRPM | HEIGHT: 74 IN | DIASTOLIC BLOOD PRESSURE: 70 MMHG | TEMPERATURE: 97.6 F | WEIGHT: 176 LBS | HEART RATE: 76 BPM | SYSTOLIC BLOOD PRESSURE: 128 MMHG | BODY MASS INDEX: 22.59 KG/M2 | OXYGEN SATURATION: 98 %

## 2018-10-15 DIAGNOSIS — R42 DIZZINESS: Primary | ICD-10-CM

## 2018-10-15 DIAGNOSIS — I95.1 ORTHOSTATIC HYPOTENSION: ICD-10-CM

## 2018-10-15 DIAGNOSIS — Z23 NEED FOR PROPHYLACTIC VACCINATION AND INOCULATION AGAINST VARICELLA: ICD-10-CM

## 2018-10-15 PROCEDURE — G8482 FLU IMMUNIZE ORDER/ADMIN: HCPCS | Performed by: INTERNAL MEDICINE

## 2018-10-15 PROCEDURE — 1111F DSCHRG MED/CURRENT MED MERGE: CPT | Performed by: INTERNAL MEDICINE

## 2018-10-15 PROCEDURE — 4040F PNEUMOC VAC/ADMIN/RCVD: CPT | Performed by: INTERNAL MEDICINE

## 2018-10-15 PROCEDURE — 1036F TOBACCO NON-USER: CPT | Performed by: INTERNAL MEDICINE

## 2018-10-15 PROCEDURE — 1123F ACP DISCUSS/DSCN MKR DOCD: CPT | Performed by: INTERNAL MEDICINE

## 2018-10-15 PROCEDURE — 1101F PT FALLS ASSESS-DOCD LE1/YR: CPT | Performed by: INTERNAL MEDICINE

## 2018-10-15 PROCEDURE — G8427 DOCREV CUR MEDS BY ELIG CLIN: HCPCS | Performed by: INTERNAL MEDICINE

## 2018-10-15 PROCEDURE — G8420 CALC BMI NORM PARAMETERS: HCPCS | Performed by: INTERNAL MEDICINE

## 2018-10-15 PROCEDURE — 99214 OFFICE O/P EST MOD 30 MIN: CPT | Performed by: INTERNAL MEDICINE

## 2018-10-15 ASSESSMENT — ENCOUNTER SYMPTOMS
RESPIRATORY NEGATIVE: 1
GASTROINTESTINAL NEGATIVE: 1

## 2018-11-15 PROCEDURE — 93228 REMOTE 30 DAY ECG REV/REPORT: CPT | Performed by: INTERNAL MEDICINE

## 2018-11-20 DIAGNOSIS — R55 PRE-SYNCOPE: ICD-10-CM

## 2018-11-21 NOTE — PROGRESS NOTES
Admission medications    Medication Sig Start Date End Date Taking? Authorizing Provider   rosuvastatin (CRESTOR) 10 MG tablet TAKE ONE TABLET BY MOUTH EVERY NIGHT AT BEDTIME 2/2/18  Yes Maureen Bronson, WILLIAN - CNP   aspirin 325 MG tablet Take 325 mg by mouth daily. Yes Historical Provider, MD   omeprazole (PRILOSEC) 20 MG capsule Take 20 mg by mouth daily as needed. 3 times a week   Yes Historical Provider, MD       Social History:   reports that he has quit smoking. He has never used smokeless tobacco. He reports that he does not drink alcohol or use drugs. Family History:  family history is not on file. Reviewed. Denies family history of sudden cardiac death, arrhythmia, premature CAD    Review of System:    · General ROS: negative for - chills, fever   · Psychological ROS: negative for - anxiety or depression  · Ophthalmic ROS: negative for - eye pain or loss of vision  · ENT ROS: negative for - epistaxis, headaches, nasal discharge, sore throat   · Allergy and Immunology ROS: negative for - hives, nasal congestion   · Hematological and Lymphatic ROS: negative for - bleeding problems, blood clots, bruising or jaundice  · Endocrine ROS: negative for - skin changes, temperature intolerance or unexpected weight changes  · Respiratory ROS: negative for - cough, hemoptysis, pleuritic pain, SOB, sputum changes or wheezing  · Cardiovascular ROS: Per HPI.    · Gastrointestinal ROS: negative for - abdominal pain, blood in stools, diarrhea, hematemesis, melena,  nausea/vomiting or swallowing difficulty/pain  · Genito-Urinary ROS: negative for - dysuria or incontinence  · Musculoskeletal ROS: negative for - joint swelling or muscle pain  · Neurological ROS: negative for - confusion, dizziness, gait disturbance, headaches, numbness/tingling, seizures,  speech problems, tremors, visual changes or weakness  · Dermatological ROS: negative for - rash    Physical Examination:  Vitals:    11/26/18 0950   BP:

## 2018-11-26 ENCOUNTER — OFFICE VISIT (OUTPATIENT)
Dept: CARDIOLOGY CLINIC | Age: 83
End: 2018-11-26
Payer: MEDICARE

## 2018-11-26 VITALS
HEIGHT: 74 IN | OXYGEN SATURATION: 98 % | SYSTOLIC BLOOD PRESSURE: 136 MMHG | HEART RATE: 90 BPM | BODY MASS INDEX: 21.82 KG/M2 | WEIGHT: 170 LBS | DIASTOLIC BLOOD PRESSURE: 72 MMHG

## 2018-11-26 DIAGNOSIS — R42 LIGHTHEADEDNESS: ICD-10-CM

## 2018-11-26 DIAGNOSIS — I49.3 PVC (PREMATURE VENTRICULAR CONTRACTION): Primary | ICD-10-CM

## 2018-11-26 DIAGNOSIS — R55 PRE-SYNCOPE: ICD-10-CM

## 2018-11-26 PROCEDURE — 93000 ELECTROCARDIOGRAM COMPLETE: CPT | Performed by: INTERNAL MEDICINE

## 2018-11-26 PROCEDURE — G8427 DOCREV CUR MEDS BY ELIG CLIN: HCPCS | Performed by: INTERNAL MEDICINE

## 2018-11-26 PROCEDURE — G8482 FLU IMMUNIZE ORDER/ADMIN: HCPCS | Performed by: INTERNAL MEDICINE

## 2018-11-26 PROCEDURE — 99214 OFFICE O/P EST MOD 30 MIN: CPT | Performed by: INTERNAL MEDICINE

## 2018-11-26 PROCEDURE — 1036F TOBACCO NON-USER: CPT | Performed by: INTERNAL MEDICINE

## 2018-11-26 PROCEDURE — 1101F PT FALLS ASSESS-DOCD LE1/YR: CPT | Performed by: INTERNAL MEDICINE

## 2018-11-26 PROCEDURE — G8420 CALC BMI NORM PARAMETERS: HCPCS | Performed by: INTERNAL MEDICINE

## 2018-11-26 PROCEDURE — 4040F PNEUMOC VAC/ADMIN/RCVD: CPT | Performed by: INTERNAL MEDICINE

## 2018-11-26 PROCEDURE — 1123F ACP DISCUSS/DSCN MKR DOCD: CPT | Performed by: INTERNAL MEDICINE

## 2018-11-26 NOTE — PATIENT INSTRUCTIONS
statements. ? Sudden problems with walking or balance. ? A sudden, severe headache that is different from past headaches.     · You have symptoms of a heart attack. These may include:  ? Chest pain or pressure, or a strange feeling in the chest.  ? Sweating. ? Shortness of breath. ? Nausea or vomiting. ? Pain, pressure, or a strange feeling in the back, neck, jaw, or upper belly or in one or both shoulders or arms. ? Lightheadedness or sudden weakness. ? A fast or irregular heartbeat. After you call 911, the  may tell you to chew 1 adult-strength or 2 to 4 low-dose aspirin. Wait for an ambulance. Do not try to drive yourself.    Watch closely for changes in your health, and be sure to contact your doctor if:    · Your lightheadedness gets worse or does not get better with home care. Where can you learn more? Go to https://[employer] org and sign in to your [employer] account. Enter Z129 in the [employer] box to learn more about \"Lightheadedness or Faintness: Care Instructions. \"     If you do not have an account, please click on the \"Sign Up Now\" link. Current as of: November 20, 2017  Content Version: 11.8  © 9394-2251 Healthwise, Incorporated. Care instructions adapted under license by [facility] ([facility]). If you have questions about a medical condition or this instruction, always ask your healthcare professional. [place] [address] any warranty or liability for your use of this information.

## 2019-02-28 RX ORDER — ROSUVASTATIN CALCIUM 10 MG/1
TABLET, COATED ORAL
Qty: 90 TABLET | Refills: 1 | Status: SHIPPED | OUTPATIENT
Start: 2019-02-28 | End: 2020-09-01 | Stop reason: SDUPTHER

## 2019-07-11 ENCOUNTER — TELEPHONE (OUTPATIENT)
Dept: INTERNAL MEDICINE CLINIC | Age: 84
End: 2019-07-11

## 2019-07-15 ENCOUNTER — OFFICE VISIT (OUTPATIENT)
Dept: INTERNAL MEDICINE CLINIC | Age: 84
End: 2019-07-15
Payer: MEDICARE

## 2019-07-15 VITALS
DIASTOLIC BLOOD PRESSURE: 78 MMHG | HEART RATE: 84 BPM | OXYGEN SATURATION: 98 % | HEIGHT: 74 IN | SYSTOLIC BLOOD PRESSURE: 130 MMHG | BODY MASS INDEX: 21.74 KG/M2 | TEMPERATURE: 98 F | WEIGHT: 169.4 LBS

## 2019-07-15 DIAGNOSIS — M25.561 ACUTE PAIN OF RIGHT KNEE: Primary | ICD-10-CM

## 2019-07-15 DIAGNOSIS — H61.23 BILATERAL IMPACTED CERUMEN: ICD-10-CM

## 2019-07-15 PROCEDURE — 99213 OFFICE O/P EST LOW 20 MIN: CPT | Performed by: INTERNAL MEDICINE

## 2019-07-15 PROCEDURE — 1036F TOBACCO NON-USER: CPT | Performed by: INTERNAL MEDICINE

## 2019-07-15 PROCEDURE — 1123F ACP DISCUSS/DSCN MKR DOCD: CPT | Performed by: INTERNAL MEDICINE

## 2019-07-15 PROCEDURE — 4040F PNEUMOC VAC/ADMIN/RCVD: CPT | Performed by: INTERNAL MEDICINE

## 2019-07-15 PROCEDURE — G8420 CALC BMI NORM PARAMETERS: HCPCS | Performed by: INTERNAL MEDICINE

## 2019-07-15 PROCEDURE — G8427 DOCREV CUR MEDS BY ELIG CLIN: HCPCS | Performed by: INTERNAL MEDICINE

## 2019-07-15 ASSESSMENT — PATIENT HEALTH QUESTIONNAIRE - PHQ9
1. LITTLE INTEREST OR PLEASURE IN DOING THINGS: 0
SUM OF ALL RESPONSES TO PHQ QUESTIONS 1-9: 0
SUM OF ALL RESPONSES TO PHQ9 QUESTIONS 1 & 2: 0
2. FEELING DOWN, DEPRESSED OR HOPELESS: 0
SUM OF ALL RESPONSES TO PHQ QUESTIONS 1-9: 0

## 2019-07-16 ENCOUNTER — TELEPHONE (OUTPATIENT)
Dept: INTERNAL MEDICINE CLINIC | Age: 84
End: 2019-07-16

## 2019-07-19 ENCOUNTER — TELEPHONE (OUTPATIENT)
Dept: INTERNAL MEDICINE CLINIC | Age: 84
End: 2019-07-19

## 2019-07-23 RX ORDER — LIDOCAINE 4 G/G
1 PATCH TOPICAL DAILY
Qty: 30 PATCH | Refills: 0 | Status: SHIPPED | OUTPATIENT
Start: 2019-07-23 | End: 2019-08-22

## 2019-07-30 DIAGNOSIS — M25.561 MEDIAL KNEE PAIN, RIGHT: Primary | ICD-10-CM

## 2019-08-02 ENCOUNTER — HOSPITAL ENCOUNTER (OUTPATIENT)
Dept: MRI IMAGING | Age: 84
Discharge: HOME | End: 2019-08-02
Payer: MEDICARE

## 2019-08-02 DIAGNOSIS — S83.281A TEAR OF LATERAL MENISCUS OF RIGHT KNEE, UNSPECIFIED TEAR TYPE, UNSPECIFIED WHETHER OLD OR CURRENT TEAR, INITIAL ENCOUNTER: Primary | ICD-10-CM

## 2019-08-02 DIAGNOSIS — M25.561 MEDIAL KNEE PAIN, RIGHT: ICD-10-CM

## 2019-08-02 PROCEDURE — 73721 MRI JNT OF LWR EXTRE W/O DYE: CPT

## 2019-08-06 ENCOUNTER — OFFICE VISIT (OUTPATIENT)
Dept: ORTHOPEDIC SURGERY | Age: 84
End: 2019-08-06
Payer: MEDICARE

## 2019-08-06 VITALS
SYSTOLIC BLOOD PRESSURE: 155 MMHG | HEIGHT: 74 IN | DIASTOLIC BLOOD PRESSURE: 80 MMHG | RESPIRATION RATE: 12 BRPM | HEART RATE: 98 BPM | WEIGHT: 169 LBS | BODY MASS INDEX: 21.69 KG/M2

## 2019-08-06 DIAGNOSIS — M25.461 KNEE EFFUSION, RIGHT: ICD-10-CM

## 2019-08-06 DIAGNOSIS — M94.261 CHONDROMALACIA, RIGHT KNEE: ICD-10-CM

## 2019-08-06 DIAGNOSIS — M25.561 ACUTE PAIN OF RIGHT KNEE: Primary | ICD-10-CM

## 2019-08-06 PROCEDURE — G8427 DOCREV CUR MEDS BY ELIG CLIN: HCPCS | Performed by: ORTHOPAEDIC SURGERY

## 2019-08-06 PROCEDURE — 4040F PNEUMOC VAC/ADMIN/RCVD: CPT | Performed by: ORTHOPAEDIC SURGERY

## 2019-08-06 PROCEDURE — 1036F TOBACCO NON-USER: CPT | Performed by: ORTHOPAEDIC SURGERY

## 2019-08-06 PROCEDURE — 1123F ACP DISCUSS/DSCN MKR DOCD: CPT | Performed by: ORTHOPAEDIC SURGERY

## 2019-08-06 PROCEDURE — 99203 OFFICE O/P NEW LOW 30 MIN: CPT | Performed by: ORTHOPAEDIC SURGERY

## 2019-08-06 PROCEDURE — 20610 DRAIN/INJ JOINT/BURSA W/O US: CPT | Performed by: ORTHOPAEDIC SURGERY

## 2019-08-06 PROCEDURE — G8420 CALC BMI NORM PARAMETERS: HCPCS | Performed by: ORTHOPAEDIC SURGERY

## 2019-08-06 RX ORDER — METHYLPREDNISOLONE ACETATE 40 MG/ML
80 INJECTION, SUSPENSION INTRA-ARTICULAR; INTRALESIONAL; INTRAMUSCULAR; SOFT TISSUE ONCE
Status: COMPLETED | OUTPATIENT
Start: 2019-08-06 | End: 2019-08-06

## 2019-08-06 RX ADMIN — METHYLPREDNISOLONE ACETATE 80 MG: 40 INJECTION, SUSPENSION INTRA-ARTICULAR; INTRALESIONAL; INTRAMUSCULAR; SOFT TISSUE at 08:45

## 2019-08-06 ASSESSMENT — ENCOUNTER SYMPTOMS
RESPIRATORY NEGATIVE: 1
EYES NEGATIVE: 1
GASTROINTESTINAL NEGATIVE: 1
ALLERGIC/IMMUNOLOGIC NEGATIVE: 1

## 2019-08-08 ENCOUNTER — HOSPITAL ENCOUNTER (OUTPATIENT)
Dept: PHYSICAL THERAPY | Age: 84
Setting detail: THERAPIES SERIES
Discharge: HOME | End: 2019-08-08
Payer: MEDICARE

## 2019-08-08 PROCEDURE — 97110 THERAPEUTIC EXERCISES: CPT

## 2019-08-08 PROCEDURE — 97161 PT EVAL LOW COMPLEX 20 MIN: CPT

## 2019-08-08 PROCEDURE — 97112 NEUROMUSCULAR REEDUCATION: CPT

## 2019-08-08 NOTE — PLAN OF CARE
1. Complex multidirectional tearing involving the superior articular surface   and free edge in the anterior horn and body of the lateral meniscus with   outward extrusion of the lateral meniscal body. 2. Degeneration in the body and posterior horn medial meniscus. 3. Mild lateral patellar facet chondromalacia. 4. Small joint effusion.        Today: Pt received cortisone injection on 8/6. He notes that pain is completely gone since shot. Prior to injection he notes he would have pain after walking for 10 minutes. He walks daily with his wife in the morning. He notes that prior to injection he felt more pain going down stairs, felt like it might buckle on him every once in a while.  He notes some mild discomfort with sit<>stand,     Relevant Medical History:HTN, OA   Functional Disability Index: WOMAC    Pain Scale: 0/10 currently; 3/10 prior to injection   Easing factors: cortisone injection,   Provocative factors: walking longer distance, descending stairs     Type: []Constant   [x]Intermittent  []Radiating []Localized []other:     Numbness/Tingling: pt denies     Functional Limitations/Impairments: []Sitting []Standing [x]Walking    [x]Squatting/bending  [x]Stairs           []ADL's  []Transfers []Sports/Recreation []Other:    Occupation/School:     Living Status/Prior Level of Function: Independent with ADLs and IADLs, walking daily, kayaking, golfing    OBJECTIVE:     Joint mobility:    [x]Normal    []Hypo   []Hyper    Palpation: no TTP     Functional Mobility/Transfers: wfl    Posture:     Bandages/Dressings/Incisions: na    Gait: (include devices/WB status) decreased tke on RLE        Flexibility L R Comment   Hamstring Lacking 30 Lacking 40 90-90 test    Gastroc      ITB      Quad                ROM PROM AROM Overpressure Comment    L R L R L R    Flexion   130 130      Extension   0 lacking 3                               Strength L R Comment   Quad 4+ 4    Hamstring 4+ 4+    Gastroc      Hip flexor consistent with knee OA/hip OA   [x]Signs/symptoms consistent with internal derangement of knee/Hip   []Signs/symptoms consistent with functional hip weakness/NMR control      []Signs/symptoms consistent with tendinitis/tendinosis    []signs/symptoms consistent with pathology which may benefit from Dry needling      []other:      Prognosis/Rehab Potential:      []Excellent   [x]Good    []Fair   []Poor    Tolerance of evaluation/treatment:    []Excellent   [x]Good    []Fair   []Poor    PLAN  Frequency/Duration: 1-2 days per week for 4-6 Weeks:  Interventions:  [x]  Therapeutic exercise including: strength training, ROM, for Lower extremity and core   [x]  NMR activation and proprioception for LE, Glutes and Core   [x]  Manual therapy as indicated for LE, Hip and spine to include: Dry Needling/IASTM, STM, PROM, Gr I-IV mobilizations, manipulation. [x] Modalities as needed that may include: thermal agents, E-stim, Biofeedback, US, iontophoresis as indicated  [x] Patient education on joint protection, postural re-education, activity modification, progression of HEP. HEP instruction: The patient's home exercise program was reviewed and they were educated on appropriate frequency, duration and intensity. The enclosed activities were performed and new exercises were added to H.E. P. with detailed pictures included. (see scanned forms)    GOALS:  Patient stated goal: tp return to walking daily, kayaking     Therapist goals for Patient:   Short Term Goals: To be achieved in: 2 weeks  1. Independent in HEP and progression per patient tolerance, in order to prevent re-injury. 2. Patient will have a decrease in pain to facilitate improvement in movement, function, and ADLs as indicated by Functional Deficits. Long Term Goals: To be achieved in: 4-6 weeks  1. Disability index score of 4% or less for the [facility] to assist with reaching prior level of function.    2. Patient will demonstrate increased AROM to 0

## 2019-08-14 ENCOUNTER — HOSPITAL ENCOUNTER (OUTPATIENT)
Dept: PHYSICAL THERAPY | Age: 84
Setting detail: THERAPIES SERIES
Discharge: HOME | End: 2019-08-14
Payer: MEDICARE

## 2019-08-14 PROCEDURE — 97112 NEUROMUSCULAR REEDUCATION: CPT

## 2019-08-14 PROCEDURE — 97530 THERAPEUTIC ACTIVITIES: CPT

## 2019-08-14 PROCEDURE — 97110 THERAPEUTIC EXERCISES: CPT

## 2019-08-14 NOTE — FLOWSHEET NOTE
patients Functional Deficits. 3. Patient will demonstrate an increase in Strength to 4+/5 or greater to allow for proper functional mobility as indicated by patients Functional Deficits. 4. Patient will return to ambulation for longer than 30 minutes for daily exercise without increased symptoms or restriction. 5. Patient will be able to sit<>stand from low surface for return to kayaking without increased symptoms or restriction. Progression Towards Functional goals:  [] Patient is progressing as expected towards functional goals listed. [] Progression is slowed due to complexities listed. [] Progression has been slowed due to co-morbidities. [x] Plan just implemented, too soon to assess goals progression  [] Other:     ASSESSMENT:  See eval    Treatment/Activity Tolerance:  [] Patient tolerated treatment well [] Patient limited by fatique  [] Patient limited by pain  [] Patient limited by other medical complications  [] Other: modiifed hamstring stretch as pt noted some cramping in quad with rope stretch. He was able to progress Ck activities, noted some mild discomfort with adduction iso but it went away when done with exercise. Continue to progress as tolerated. Reviewed insurance benefits for physical therapy in an outpatient hospital based setting with the patient, including deductible of $185 and allowable visit number. Pt was informed of possible out of pocket costs, as well as, informed of other service options such as the [facility] program to assist with cost and/or limited visit number. Prognosis: [] Good [] Fair  [] Poor    Patient Requires Follow-up: [x] Yes  [] No    PLAN: See eval  [] Continue per plan of care [] Alter current plan (see comments)  [x] Plan of care initiated [] Hold pending MD visit [] Discharge  If patient does not return, this shall be considered DC summary.      Electronically signed by:   Luis A Eden, PT, DPT, Cert DN

## 2019-08-16 ENCOUNTER — APPOINTMENT (OUTPATIENT)
Dept: PHYSICAL THERAPY | Age: 84
End: 2019-08-16
Payer: MEDICARE

## 2019-08-21 ENCOUNTER — HOSPITAL ENCOUNTER (OUTPATIENT)
Dept: PHYSICAL THERAPY | Age: 84
Setting detail: THERAPIES SERIES
Discharge: HOME | End: 2019-08-21
Payer: MEDICARE

## 2019-08-21 PROCEDURE — 97530 THERAPEUTIC ACTIVITIES: CPT

## 2019-08-21 PROCEDURE — 97112 NEUROMUSCULAR REEDUCATION: CPT

## 2019-08-21 PROCEDURE — 97110 THERAPEUTIC EXERCISES: CPT

## 2019-08-21 NOTE — FLOWSHEET NOTE
Supine 2x10 stanley    Prone     Abduction 2x10 standing     Adducton     SLR+     Clams                    Isometrics     Quad sets 10x10    Hip Adduction 10x10    Hamstring                    Patellar Glides     Medial     Superior     Inferior          ROM     Sheet Pulls     Wall Slides     Edge of bed     Flexionator     Extensionator     Hang Weights     Ankle Pumps                              CKC     Calf raises     Wall sits     Step ups 2x10 stanley L3    Step up and over     Lateral Step Downs               Mini squats 2x10     CC TKE          Lateral band walks     Side Planks     Half moon     Single leg flow          Biodex-balance     Single leg stance [id number]    Plyoback          Stool scoots     SB bridge     SB HS Curls     Planks          PRE     Extension  RANGE: 90/40   Flexion  RANGE: 0/90        Cable Column          Leg Press  RANGE: 70/10        Bike 7 min level 2     Treadmill                     Therapeutic Exercise and NMR EXR  [] (06351) Provided verbal/tactile cueing for activities related to strengthening, flexibility, endurance, ROM for improvements in LE, proximal hip, and core control with self care, mobility, lifting, ambulation.  [] (15887) Provided verbal/tactile cueing for activities related to improving balance, coordination, kinesthetic sense, posture, motor skill, proprioception  to assist with LE, proximal hip, and core control in self care, mobility, lifting, ambulation and eccentric single leg control.      NMR and Therapeutic Activities:    [] (56977 or 49984) Provided verbal/tactile cueing for activities related to improving balance, coordination, kinesthetic sense, posture, motor skill, proprioception and motor activation to allow for proper function of core, proximal hip and LE with self care and ADLs  [] (22747) Gait Re-education- Provided training and instruction to the patient for proper LE, core and proximal hip recruitment and positioning and eccentric body weight

## 2019-08-23 ENCOUNTER — APPOINTMENT (OUTPATIENT)
Dept: PHYSICAL THERAPY | Age: 84
End: 2019-08-23
Payer: MEDICARE

## 2019-08-28 ENCOUNTER — HOSPITAL ENCOUNTER (OUTPATIENT)
Dept: PHYSICAL THERAPY | Age: 84
Setting detail: THERAPIES SERIES
Discharge: HOME | End: 2019-08-28
Payer: MEDICARE

## 2019-08-28 PROCEDURE — 97112 NEUROMUSCULAR REEDUCATION: CPT

## 2019-08-28 PROCEDURE — 97110 THERAPEUTIC EXERCISES: CPT

## 2019-08-28 PROCEDURE — 97530 THERAPEUTIC ACTIVITIES: CPT

## 2019-08-28 RX ORDER — ROSUVASTATIN CALCIUM 10 MG/1
TABLET, COATED ORAL
Qty: 90 TABLET | Refills: 5 | Status: SHIPPED | OUTPATIENT
Start: 2019-08-28 | End: 2019-11-20 | Stop reason: SDUPTHER

## 2019-08-28 NOTE — FLOWSHEET NOTE
[address] [facility], [place]                                                         Physical Therapy Daily Treatment Note  Date:  2019    Patient Name:  Rentao Barrios    :  1933  MRN: [id number]    Medical/Treatment Diagnosis Information:  · Diagnosis: M94.261 (ICD-10-CM) - Chondromalacia, right knee  · Treatment Diagnosis: M25.561 (ICD-10-CM) - Acute pain of right knee  Insurance/Certification information:  PT Insurance Information: Medicare   Physician Information:  Referring Practitioner: Iftikhar Dupree of care signed (Y/N):     Date of Patient follow up with Physician:     Functional Assessment:  Functional Assessment scale used: WOMAC  Score: 7%    Progress Note: [x]  Yes  []  No  Next due by: Visit #10       Latex Allergy:  [x]NO      []YES  Preferred Language for Healthcare:   [x]English       []other:    Visit # Insurance Allowable   4 MN     Pain level:  0/10     SUBJECTIVE:  Pt notes his knee is doing well, he has no issues with walking/stairs.      OBJECTIVE: 19   Observation:    Test measurements:      Flexibility L R Comment   Hamstring Lacking 30 Lacking 40 90-90 test    Gastroc         ITB         Quad                         ROM PROM AROM Overpressure Comment     L R L R L R     Flexion     130 130         Extension     0 lacking 3                                                    Strength L R Comment   Quad 4+ 4     Hamstring 4+ 4+     Gastroc         Hip flexor         Hip ABD 4+ 4                                 RESTRICTIONS/PRECAUTIONS:     Exercises/Interventions:     Exercise/Equipment Resistance/Repetitions Other comments   Stretching     Hamstring 10x10 with heel on step  Modified for better stretch   Hip Flexion     ITB     Grion     Quad     Inclined Calf     Towel Pull          SLR     Supine 2x10 stanley    Prone     Abduction 2x10 standing     Adducton     SLR+     Clams

## 2019-08-30 ENCOUNTER — HOSPITAL ENCOUNTER (OUTPATIENT)
Dept: PHYSICAL THERAPY | Age: 84
Setting detail: THERAPIES SERIES
Discharge: HOME | End: 2019-08-30
Payer: MEDICARE

## 2019-08-30 PROCEDURE — 97112 NEUROMUSCULAR REEDUCATION: CPT

## 2019-08-30 PROCEDURE — 97530 THERAPEUTIC ACTIVITIES: CPT

## 2019-08-30 PROCEDURE — 97110 THERAPEUTIC EXERCISES: CPT

## 2019-09-17 ENCOUNTER — NURSE ONLY (OUTPATIENT)
Dept: INTERNAL MEDICINE CLINIC | Age: 84
End: 2019-09-17
Payer: MEDICARE

## 2019-09-17 ENCOUNTER — OFFICE VISIT (OUTPATIENT)
Dept: ORTHOPEDIC SURGERY | Age: 84
End: 2019-09-17
Payer: MEDICARE

## 2019-09-17 VITALS
DIASTOLIC BLOOD PRESSURE: 79 MMHG | WEIGHT: 169 LBS | BODY MASS INDEX: 21.69 KG/M2 | HEART RATE: 92 BPM | SYSTOLIC BLOOD PRESSURE: 145 MMHG | HEIGHT: 74 IN | RESPIRATION RATE: 12 BRPM

## 2019-09-17 DIAGNOSIS — Z23 FLU VACCINE NEED: Primary | ICD-10-CM

## 2019-09-17 DIAGNOSIS — M94.261 CHONDROMALACIA, RIGHT KNEE: Primary | ICD-10-CM

## 2019-09-17 PROCEDURE — 1123F ACP DISCUSS/DSCN MKR DOCD: CPT | Performed by: ORTHOPAEDIC SURGERY

## 2019-09-17 PROCEDURE — 90653 IIV ADJUVANT VACCINE IM: CPT | Performed by: INTERNAL MEDICINE

## 2019-09-17 PROCEDURE — 1036F TOBACCO NON-USER: CPT | Performed by: ORTHOPAEDIC SURGERY

## 2019-09-17 PROCEDURE — G8427 DOCREV CUR MEDS BY ELIG CLIN: HCPCS | Performed by: ORTHOPAEDIC SURGERY

## 2019-09-17 PROCEDURE — 99212 OFFICE O/P EST SF 10 MIN: CPT | Performed by: ORTHOPAEDIC SURGERY

## 2019-09-17 PROCEDURE — G8420 CALC BMI NORM PARAMETERS: HCPCS | Performed by: ORTHOPAEDIC SURGERY

## 2019-09-17 PROCEDURE — 4040F PNEUMOC VAC/ADMIN/RCVD: CPT | Performed by: ORTHOPAEDIC SURGERY

## 2019-09-17 PROCEDURE — G0008 ADMIN INFLUENZA VIRUS VAC: HCPCS | Performed by: INTERNAL MEDICINE

## 2019-10-08 ENCOUNTER — TELEPHONE (OUTPATIENT)
Dept: PHYSICAL THERAPY | Age: 84
End: 2019-10-08

## 2019-10-10 ENCOUNTER — OFFICE VISIT (OUTPATIENT)
Dept: ORTHOPEDIC SURGERY | Age: 84
End: 2019-10-10
Payer: MEDICARE

## 2019-10-10 VITALS
WEIGHT: 169 LBS | DIASTOLIC BLOOD PRESSURE: 87 MMHG | HEIGHT: 74 IN | HEART RATE: 118 BPM | SYSTOLIC BLOOD PRESSURE: 157 MMHG | BODY MASS INDEX: 21.69 KG/M2

## 2019-10-10 DIAGNOSIS — M25.561 ACUTE PAIN OF RIGHT KNEE: Primary | ICD-10-CM

## 2019-10-10 PROCEDURE — E0100 CANE ADJUST/FIXED WITH TIP: HCPCS | Performed by: ORTHOPAEDIC SURGERY

## 2019-10-10 PROCEDURE — 4040F PNEUMOC VAC/ADMIN/RCVD: CPT | Performed by: ORTHOPAEDIC SURGERY

## 2019-10-10 PROCEDURE — 1036F TOBACCO NON-USER: CPT | Performed by: ORTHOPAEDIC SURGERY

## 2019-10-10 PROCEDURE — 1123F ACP DISCUSS/DSCN MKR DOCD: CPT | Performed by: ORTHOPAEDIC SURGERY

## 2019-10-10 PROCEDURE — 99212 OFFICE O/P EST SF 10 MIN: CPT | Performed by: ORTHOPAEDIC SURGERY

## 2019-10-10 PROCEDURE — G8427 DOCREV CUR MEDS BY ELIG CLIN: HCPCS | Performed by: ORTHOPAEDIC SURGERY

## 2019-10-10 PROCEDURE — G8420 CALC BMI NORM PARAMETERS: HCPCS | Performed by: ORTHOPAEDIC SURGERY

## 2019-10-10 PROCEDURE — G8482 FLU IMMUNIZE ORDER/ADMIN: HCPCS | Performed by: ORTHOPAEDIC SURGERY

## 2019-10-10 RX ORDER — METHYLPREDNISOLONE 4 MG/1
TABLET ORAL
Qty: 1 KIT | Refills: 0 | Status: SHIPPED | OUTPATIENT
Start: 2019-10-10 | End: 2019-11-20 | Stop reason: ALTCHOICE

## 2019-10-14 ENCOUNTER — OFFICE VISIT (OUTPATIENT)
Dept: ORTHOPEDIC SURGERY | Age: 84
End: 2019-10-14
Payer: MEDICARE

## 2019-10-14 VITALS
HEIGHT: 74 IN | DIASTOLIC BLOOD PRESSURE: 75 MMHG | SYSTOLIC BLOOD PRESSURE: 158 MMHG | WEIGHT: 169 LBS | BODY MASS INDEX: 21.69 KG/M2 | HEART RATE: 106 BPM

## 2019-10-14 DIAGNOSIS — M84.469A INSUFFICIENCY FRACTURE OF TIBIA, INITIAL ENCOUNTER: ICD-10-CM

## 2019-10-14 DIAGNOSIS — M25.561 ACUTE PAIN OF RIGHT KNEE: Primary | ICD-10-CM

## 2019-10-14 PROCEDURE — 1123F ACP DISCUSS/DSCN MKR DOCD: CPT | Performed by: ORTHOPAEDIC SURGERY

## 2019-10-14 PROCEDURE — G8420 CALC BMI NORM PARAMETERS: HCPCS | Performed by: ORTHOPAEDIC SURGERY

## 2019-10-14 PROCEDURE — 99213 OFFICE O/P EST LOW 20 MIN: CPT | Performed by: ORTHOPAEDIC SURGERY

## 2019-10-14 PROCEDURE — G8482 FLU IMMUNIZE ORDER/ADMIN: HCPCS | Performed by: ORTHOPAEDIC SURGERY

## 2019-10-14 PROCEDURE — 1036F TOBACCO NON-USER: CPT | Performed by: ORTHOPAEDIC SURGERY

## 2019-10-14 PROCEDURE — 4040F PNEUMOC VAC/ADMIN/RCVD: CPT | Performed by: ORTHOPAEDIC SURGERY

## 2019-10-14 PROCEDURE — G8427 DOCREV CUR MEDS BY ELIG CLIN: HCPCS | Performed by: ORTHOPAEDIC SURGERY

## 2019-10-15 ENCOUNTER — TELEPHONE (OUTPATIENT)
Dept: ORTHOPEDIC SURGERY | Age: 84
End: 2019-10-15

## 2019-10-15 DIAGNOSIS — M25.561 ACUTE PAIN OF RIGHT KNEE: Primary | ICD-10-CM

## 2019-10-15 RX ORDER — HYDROCODONE BITARTRATE AND ACETAMINOPHEN 5; 325 MG/1; MG/1
1 TABLET ORAL EVERY 8 HOURS PRN
Qty: 14 TABLET | Refills: 0 | Status: SHIPPED | OUTPATIENT
Start: 2019-10-15 | End: 2019-10-22

## 2019-10-21 ENCOUNTER — OFFICE VISIT (OUTPATIENT)
Dept: ORTHOPEDIC SURGERY | Age: 84
End: 2019-10-21
Payer: MEDICARE

## 2019-10-21 VITALS
HEART RATE: 116 BPM | HEIGHT: 74 IN | WEIGHT: 169 LBS | DIASTOLIC BLOOD PRESSURE: 85 MMHG | BODY MASS INDEX: 21.69 KG/M2 | SYSTOLIC BLOOD PRESSURE: 151 MMHG

## 2019-10-21 DIAGNOSIS — M94.261 CHONDROMALACIA, RIGHT KNEE: ICD-10-CM

## 2019-10-21 DIAGNOSIS — M25.561 ACUTE PAIN OF RIGHT KNEE: Primary | ICD-10-CM

## 2019-10-21 DIAGNOSIS — M70.51 PES ANSERINUS BURSITIS OF RIGHT KNEE: ICD-10-CM

## 2019-10-21 PROCEDURE — 4040F PNEUMOC VAC/ADMIN/RCVD: CPT | Performed by: ORTHOPAEDIC SURGERY

## 2019-10-21 PROCEDURE — G8427 DOCREV CUR MEDS BY ELIG CLIN: HCPCS | Performed by: ORTHOPAEDIC SURGERY

## 2019-10-21 PROCEDURE — 1036F TOBACCO NON-USER: CPT | Performed by: ORTHOPAEDIC SURGERY

## 2019-10-21 PROCEDURE — G8420 CALC BMI NORM PARAMETERS: HCPCS | Performed by: ORTHOPAEDIC SURGERY

## 2019-10-21 PROCEDURE — 20610 DRAIN/INJ JOINT/BURSA W/O US: CPT | Performed by: ORTHOPAEDIC SURGERY

## 2019-10-21 PROCEDURE — G8482 FLU IMMUNIZE ORDER/ADMIN: HCPCS | Performed by: ORTHOPAEDIC SURGERY

## 2019-10-21 PROCEDURE — 99213 OFFICE O/P EST LOW 20 MIN: CPT | Performed by: ORTHOPAEDIC SURGERY

## 2019-10-21 PROCEDURE — 1123F ACP DISCUSS/DSCN MKR DOCD: CPT | Performed by: ORTHOPAEDIC SURGERY

## 2019-10-21 RX ORDER — METHYLPREDNISOLONE ACETATE 40 MG/ML
80 INJECTION, SUSPENSION INTRA-ARTICULAR; INTRALESIONAL; INTRAMUSCULAR; SOFT TISSUE ONCE
Status: COMPLETED | OUTPATIENT
Start: 2019-10-21 | End: 2019-10-21

## 2019-10-21 RX ORDER — LIDOCAINE HYDROCHLORIDE 10 MG/ML
2 INJECTION, SOLUTION INFILTRATION; PERINEURAL ONCE
Status: COMPLETED | OUTPATIENT
Start: 2019-10-21 | End: 2019-10-21

## 2019-10-21 RX ADMIN — METHYLPREDNISOLONE ACETATE 80 MG: 40 INJECTION, SUSPENSION INTRA-ARTICULAR; INTRALESIONAL; INTRAMUSCULAR; SOFT TISSUE at 14:09

## 2019-10-21 RX ADMIN — LIDOCAINE HYDROCHLORIDE 2 ML: 10 INJECTION, SOLUTION INFILTRATION; PERINEURAL at 14:10

## 2019-11-07 ENCOUNTER — OFFICE VISIT (OUTPATIENT)
Dept: ORTHOPEDIC SURGERY | Age: 84
End: 2019-11-07
Payer: MEDICARE

## 2019-11-07 VITALS
WEIGHT: 169 LBS | HEART RATE: 104 BPM | DIASTOLIC BLOOD PRESSURE: 83 MMHG | HEIGHT: 74 IN | BODY MASS INDEX: 21.69 KG/M2 | SYSTOLIC BLOOD PRESSURE: 160 MMHG

## 2019-11-07 DIAGNOSIS — M94.261 CHONDROMALACIA, RIGHT KNEE: ICD-10-CM

## 2019-11-07 DIAGNOSIS — M70.51 PES ANSERINUS BURSITIS OF RIGHT KNEE: ICD-10-CM

## 2019-11-07 DIAGNOSIS — M25.561 ACUTE PAIN OF RIGHT KNEE: Primary | ICD-10-CM

## 2019-11-07 PROCEDURE — 1036F TOBACCO NON-USER: CPT | Performed by: ORTHOPAEDIC SURGERY

## 2019-11-07 PROCEDURE — 4040F PNEUMOC VAC/ADMIN/RCVD: CPT | Performed by: ORTHOPAEDIC SURGERY

## 2019-11-07 PROCEDURE — 99212 OFFICE O/P EST SF 10 MIN: CPT | Performed by: ORTHOPAEDIC SURGERY

## 2019-11-07 PROCEDURE — G8427 DOCREV CUR MEDS BY ELIG CLIN: HCPCS | Performed by: ORTHOPAEDIC SURGERY

## 2019-11-07 PROCEDURE — G8482 FLU IMMUNIZE ORDER/ADMIN: HCPCS | Performed by: ORTHOPAEDIC SURGERY

## 2019-11-07 PROCEDURE — 1123F ACP DISCUSS/DSCN MKR DOCD: CPT | Performed by: ORTHOPAEDIC SURGERY

## 2019-11-07 PROCEDURE — G8420 CALC BMI NORM PARAMETERS: HCPCS | Performed by: ORTHOPAEDIC SURGERY

## 2019-11-12 ENCOUNTER — OFFICE VISIT (OUTPATIENT)
Dept: ORTHOPEDIC SURGERY | Age: 84
End: 2019-11-12
Payer: MEDICARE

## 2019-11-12 ENCOUNTER — TELEPHONE (OUTPATIENT)
Dept: ORTHOPEDIC SURGERY | Age: 84
End: 2019-11-12

## 2019-11-12 VITALS
BODY MASS INDEX: 21.69 KG/M2 | SYSTOLIC BLOOD PRESSURE: 172 MMHG | HEIGHT: 74 IN | DIASTOLIC BLOOD PRESSURE: 87 MMHG | WEIGHT: 169 LBS | HEART RATE: 114 BPM

## 2019-11-12 DIAGNOSIS — M70.51 PES ANSERINUS BURSITIS OF RIGHT KNEE: ICD-10-CM

## 2019-11-12 DIAGNOSIS — M25.561 ACUTE PAIN OF RIGHT KNEE: Primary | ICD-10-CM

## 2019-11-12 DIAGNOSIS — M94.261 CHONDROMALACIA, RIGHT KNEE: ICD-10-CM

## 2019-11-12 PROCEDURE — 99213 OFFICE O/P EST LOW 20 MIN: CPT | Performed by: ORTHOPAEDIC SURGERY

## 2019-11-12 PROCEDURE — G8427 DOCREV CUR MEDS BY ELIG CLIN: HCPCS | Performed by: ORTHOPAEDIC SURGERY

## 2019-11-12 PROCEDURE — 4040F PNEUMOC VAC/ADMIN/RCVD: CPT | Performed by: ORTHOPAEDIC SURGERY

## 2019-11-12 PROCEDURE — G8482 FLU IMMUNIZE ORDER/ADMIN: HCPCS | Performed by: ORTHOPAEDIC SURGERY

## 2019-11-12 PROCEDURE — 1036F TOBACCO NON-USER: CPT | Performed by: ORTHOPAEDIC SURGERY

## 2019-11-12 PROCEDURE — G8420 CALC BMI NORM PARAMETERS: HCPCS | Performed by: ORTHOPAEDIC SURGERY

## 2019-11-12 PROCEDURE — 1123F ACP DISCUSS/DSCN MKR DOCD: CPT | Performed by: ORTHOPAEDIC SURGERY

## 2019-11-12 PROCEDURE — 20610 DRAIN/INJ JOINT/BURSA W/O US: CPT | Performed by: ORTHOPAEDIC SURGERY

## 2019-11-12 RX ORDER — METHYLPREDNISOLONE ACETATE 40 MG/ML
80 INJECTION, SUSPENSION INTRA-ARTICULAR; INTRALESIONAL; INTRAMUSCULAR; SOFT TISSUE ONCE
Status: COMPLETED | OUTPATIENT
Start: 2019-11-12 | End: 2019-11-12

## 2019-11-12 RX ORDER — LIDOCAINE HYDROCHLORIDE 10 MG/ML
4 INJECTION, SOLUTION INFILTRATION; PERINEURAL ONCE
Status: COMPLETED | OUTPATIENT
Start: 2019-11-12 | End: 2019-11-12

## 2019-11-12 RX ADMIN — LIDOCAINE HYDROCHLORIDE 4 ML: 10 INJECTION, SOLUTION INFILTRATION; PERINEURAL at 14:07

## 2019-11-12 RX ADMIN — METHYLPREDNISOLONE ACETATE 80 MG: 40 INJECTION, SUSPENSION INTRA-ARTICULAR; INTRALESIONAL; INTRAMUSCULAR; SOFT TISSUE at 14:07

## 2019-11-12 RX ADMIN — METHYLPREDNISOLONE ACETATE 80 MG: 40 INJECTION, SUSPENSION INTRA-ARTICULAR; INTRALESIONAL; INTRAMUSCULAR; SOFT TISSUE at 14:09

## 2019-11-19 ENCOUNTER — HOSPITAL ENCOUNTER (OUTPATIENT)
Dept: PHYSICAL THERAPY | Age: 84
Setting detail: THERAPIES SERIES
Discharge: HOME | End: 2019-11-19
Payer: MEDICARE

## 2019-11-19 PROCEDURE — 97161 PT EVAL LOW COMPLEX 20 MIN: CPT

## 2019-11-19 PROCEDURE — 97112 NEUROMUSCULAR REEDUCATION: CPT

## 2019-11-19 PROCEDURE — 97110 THERAPEUTIC EXERCISES: CPT

## 2019-11-20 ENCOUNTER — OFFICE VISIT (OUTPATIENT)
Dept: INTERNAL MEDICINE CLINIC | Age: 84
End: 2019-11-20
Payer: MEDICARE

## 2019-11-20 VITALS
SYSTOLIC BLOOD PRESSURE: 144 MMHG | TEMPERATURE: 98 F | HEIGHT: 74 IN | WEIGHT: 166.2 LBS | OXYGEN SATURATION: 99 % | DIASTOLIC BLOOD PRESSURE: 78 MMHG | HEART RATE: 98 BPM | RESPIRATION RATE: 20 BRPM | BODY MASS INDEX: 21.33 KG/M2

## 2019-11-20 DIAGNOSIS — H11.31 SCLERAL HEMORRHAGE OF RIGHT EYE: ICD-10-CM

## 2019-11-20 PROCEDURE — 1036F TOBACCO NON-USER: CPT | Performed by: INTERNAL MEDICINE

## 2019-11-20 PROCEDURE — 4040F PNEUMOC VAC/ADMIN/RCVD: CPT | Performed by: INTERNAL MEDICINE

## 2019-11-20 PROCEDURE — 99213 OFFICE O/P EST LOW 20 MIN: CPT | Performed by: INTERNAL MEDICINE

## 2019-11-20 PROCEDURE — G8420 CALC BMI NORM PARAMETERS: HCPCS | Performed by: INTERNAL MEDICINE

## 2019-11-20 PROCEDURE — G8482 FLU IMMUNIZE ORDER/ADMIN: HCPCS | Performed by: INTERNAL MEDICINE

## 2019-11-20 PROCEDURE — 1123F ACP DISCUSS/DSCN MKR DOCD: CPT | Performed by: INTERNAL MEDICINE

## 2019-11-20 PROCEDURE — G8427 DOCREV CUR MEDS BY ELIG CLIN: HCPCS | Performed by: INTERNAL MEDICINE

## 2019-11-20 RX ORDER — POLYETHYLENE GLYCOL 3350 17 G/17G
17 POWDER, FOR SOLUTION ORAL DAILY
COMMUNITY

## 2019-11-20 ASSESSMENT — ENCOUNTER SYMPTOMS
EYE ITCHING: 0
EYE PAIN: 0
PHOTOPHOBIA: 0
EYE DISCHARGE: 0
EYE REDNESS: 1

## 2019-11-25 ENCOUNTER — APPOINTMENT (OUTPATIENT)
Dept: PHYSICAL THERAPY | Age: 84
End: 2019-11-25
Payer: MEDICARE

## 2019-11-27 ENCOUNTER — HOSPITAL ENCOUNTER (OUTPATIENT)
Dept: PHYSICAL THERAPY | Age: 84
Setting detail: THERAPIES SERIES
Discharge: HOME | End: 2019-11-27
Payer: MEDICARE

## 2019-11-27 PROCEDURE — 97110 THERAPEUTIC EXERCISES: CPT

## 2019-11-27 PROCEDURE — 97112 NEUROMUSCULAR REEDUCATION: CPT

## 2019-12-03 ENCOUNTER — OFFICE VISIT (OUTPATIENT)
Dept: ORTHOPEDIC SURGERY | Age: 84
End: 2019-12-03
Payer: MEDICARE

## 2019-12-03 VITALS
HEART RATE: 109 BPM | SYSTOLIC BLOOD PRESSURE: 150 MMHG | HEIGHT: 74 IN | DIASTOLIC BLOOD PRESSURE: 76 MMHG | BODY MASS INDEX: 21.69 KG/M2 | WEIGHT: 169 LBS

## 2019-12-03 DIAGNOSIS — M25.561 ACUTE PAIN OF RIGHT KNEE: Primary | ICD-10-CM

## 2019-12-03 DIAGNOSIS — M94.261 CHONDROMALACIA, RIGHT KNEE: ICD-10-CM

## 2019-12-03 DIAGNOSIS — M70.51 PES ANSERINUS BURSITIS OF RIGHT KNEE: ICD-10-CM

## 2019-12-03 PROCEDURE — G8420 CALC BMI NORM PARAMETERS: HCPCS | Performed by: ORTHOPAEDIC SURGERY

## 2019-12-03 PROCEDURE — 4040F PNEUMOC VAC/ADMIN/RCVD: CPT | Performed by: ORTHOPAEDIC SURGERY

## 2019-12-03 PROCEDURE — 1036F TOBACCO NON-USER: CPT | Performed by: ORTHOPAEDIC SURGERY

## 2019-12-03 PROCEDURE — G8482 FLU IMMUNIZE ORDER/ADMIN: HCPCS | Performed by: ORTHOPAEDIC SURGERY

## 2019-12-03 PROCEDURE — 1123F ACP DISCUSS/DSCN MKR DOCD: CPT | Performed by: ORTHOPAEDIC SURGERY

## 2019-12-03 PROCEDURE — G8427 DOCREV CUR MEDS BY ELIG CLIN: HCPCS | Performed by: ORTHOPAEDIC SURGERY

## 2019-12-03 PROCEDURE — 99212 OFFICE O/P EST SF 10 MIN: CPT | Performed by: ORTHOPAEDIC SURGERY

## 2019-12-05 ENCOUNTER — HOSPITAL ENCOUNTER (OUTPATIENT)
Dept: PHYSICAL THERAPY | Age: 84
Setting detail: THERAPIES SERIES
Discharge: HOME | End: 2019-12-05
Payer: MEDICARE

## 2019-12-05 PROCEDURE — 97112 NEUROMUSCULAR REEDUCATION: CPT

## 2019-12-05 PROCEDURE — 97110 THERAPEUTIC EXERCISES: CPT

## 2020-01-21 ENCOUNTER — OFFICE VISIT (OUTPATIENT)
Dept: INTERNAL MEDICINE CLINIC | Age: 85
End: 2020-01-21
Payer: MEDICARE

## 2020-01-21 VITALS
HEART RATE: 104 BPM | SYSTOLIC BLOOD PRESSURE: 146 MMHG | WEIGHT: 169.2 LBS | HEIGHT: 74 IN | BODY MASS INDEX: 21.72 KG/M2 | OXYGEN SATURATION: 98 % | DIASTOLIC BLOOD PRESSURE: 80 MMHG | TEMPERATURE: 98.1 F | RESPIRATION RATE: 18 BRPM

## 2020-01-21 LAB
BASOPHILS ABSOLUTE: 0.1 K/UL (ref 0–0.2)
BASOPHILS RELATIVE PERCENT: 0.8 %
EOSINOPHILS ABSOLUTE: 0.1 K/UL (ref 0–0.6)
EOSINOPHILS RELATIVE PERCENT: 1.1 %
HCT VFR BLD CALC: 41.9 % (ref 40.5–52.5)
HEMOGLOBIN: 14 G/DL (ref 13.5–17.5)
LYMPHOCYTES ABSOLUTE: 2.1 K/UL (ref 1–5.1)
LYMPHOCYTES RELATIVE PERCENT: 29.8 %
MCH RBC QN AUTO: 32.9 PG (ref 26–34)
MCHC RBC AUTO-ENTMCNC: 33.5 G/DL (ref 31–36)
MCV RBC AUTO: 98.4 FL (ref 80–100)
MONOCYTES ABSOLUTE: 0.6 K/UL (ref 0–1.3)
MONOCYTES RELATIVE PERCENT: 8.8 %
NEUTROPHILS ABSOLUTE: 4.2 K/UL (ref 1.7–7.7)
NEUTROPHILS RELATIVE PERCENT: 59.5 %
PDW BLD-RTO: 13.6 % (ref 12.4–15.4)
PLATELET # BLD: 163 K/UL (ref 135–450)
PMV BLD AUTO: 9.9 FL (ref 5–10.5)
RBC # BLD: 4.26 M/UL (ref 4.2–5.9)
WBC # BLD: 7 K/UL (ref 4–11)

## 2020-01-21 PROCEDURE — 4040F PNEUMOC VAC/ADMIN/RCVD: CPT | Performed by: INTERNAL MEDICINE

## 2020-01-21 PROCEDURE — G8427 DOCREV CUR MEDS BY ELIG CLIN: HCPCS | Performed by: INTERNAL MEDICINE

## 2020-01-21 PROCEDURE — G8482 FLU IMMUNIZE ORDER/ADMIN: HCPCS | Performed by: INTERNAL MEDICINE

## 2020-01-21 PROCEDURE — 1036F TOBACCO NON-USER: CPT | Performed by: INTERNAL MEDICINE

## 2020-01-21 PROCEDURE — 36415 COLL VENOUS BLD VENIPUNCTURE: CPT | Performed by: INTERNAL MEDICINE

## 2020-01-21 PROCEDURE — G8420 CALC BMI NORM PARAMETERS: HCPCS | Performed by: INTERNAL MEDICINE

## 2020-01-21 PROCEDURE — 93000 ELECTROCARDIOGRAM COMPLETE: CPT | Performed by: INTERNAL MEDICINE

## 2020-01-21 PROCEDURE — 99215 OFFICE O/P EST HI 40 MIN: CPT | Performed by: INTERNAL MEDICINE

## 2020-01-21 PROCEDURE — 1123F ACP DISCUSS/DSCN MKR DOCD: CPT | Performed by: INTERNAL MEDICINE

## 2020-01-21 RX ORDER — ALPRAZOLAM 0.5 MG/1
0.5 TABLET ORAL 2 TIMES DAILY PRN
Qty: 60 TABLET | Refills: 0 | Status: SHIPPED | OUTPATIENT
Start: 2020-01-21 | End: 2020-02-20

## 2020-01-21 ASSESSMENT — PATIENT HEALTH QUESTIONNAIRE - PHQ9
SUM OF ALL RESPONSES TO PHQ QUESTIONS 1-9: 0
SUM OF ALL RESPONSES TO PHQ QUESTIONS 1-9: 0
SUM OF ALL RESPONSES TO PHQ9 QUESTIONS 1 & 2: 0
1. LITTLE INTEREST OR PLEASURE IN DOING THINGS: 0
2. FEELING DOWN, DEPRESSED OR HOPELESS: 0

## 2020-01-21 ASSESSMENT — ENCOUNTER SYMPTOMS
EYES NEGATIVE: 1
GASTROINTESTINAL NEGATIVE: 1
RESPIRATORY NEGATIVE: 1

## 2020-01-21 NOTE — PROGRESS NOTES
together: Not on file     Attends L[employer]-day service: Not on file     Active member of club or organization: Not on file     Attends meetings of clubs or organizations: Not on file     Relationship status: Not on file    Intimate partner violence:     Fear of current or ex partner: Not on file     Emotionally abused: Not on file     Physically abused: Not on file     Forced sexual activity: Not on file   Other Topics Concern    Not on file   Social History Narrative    Not on file       History reviewed. No pertinent family history. Review of Systems   Constitutional: Positive for activity change. HENT: Positive for hearing loss. Eyes: Negative. Respiratory: Negative. Cardiovascular: Negative. Gastrointestinal: Negative. Musculoskeletal: Positive for arthralgias. Neurological: Negative. Psychiatric/Behavioral: The patient is nervous/anxious. 14 point ros otherwise negative. Objective:   Physical Exam  Constitutional:       Appearance: Normal appearance. HENT:      Mouth/Throat:      Mouth: Mucous membranes are moist.      Pharynx: Oropharynx is clear. Eyes:      Extraocular Movements: Extraocular movements intact. Pupils: Pupils are equal, round, and reactive to light. Neck:      Vascular: No carotid bruit. Cardiovascular:      Rate and Rhythm: Normal rate and regular rhythm. Pulses: Normal pulses. Pulmonary:      Effort: Pulmonary effort is normal.      Breath sounds: Normal breath sounds. Abdominal:      General: Abdomen is flat. Palpations: Abdomen is soft. Tenderness: There is no tenderness. Musculoskeletal:         General: No swelling. Comments: Mildly painful rom of the right knee. Skin:     General: Skin is warm and dry. Neurological:      General: No focal deficit present. Mental Status: He is alert. Psychiatric:         Mood and Affect: Mood is anxious. Assessment:      1. Pre-op exam    2.  Acute medial meniscus

## 2020-06-04 PROBLEM — R55 PRE-SYNCOPE: Status: RESOLVED | Noted: 2018-10-07 | Resolved: 2020-06-04

## 2020-06-04 PROBLEM — Z76.89 ENCOUNTER TO ESTABLISH CARE: Status: ACTIVE | Noted: 2020-06-04

## 2020-10-29 ENCOUNTER — OFFICE VISIT (OUTPATIENT)
Dept: PRIMARY CARE CLINIC | Age: 85
End: 2020-10-29
Payer: MEDICARE

## 2020-10-29 PROCEDURE — 99211 OFF/OP EST MAY X REQ PHY/QHP: CPT | Performed by: NURSE PRACTITIONER

## 2020-10-29 RX ORDER — ASPIRIN 81 MG/1
81 TABLET ORAL DAILY
COMMUNITY

## 2020-10-29 NOTE — PROGRESS NOTES
[facility] [facility] time___0930_________        Surgery time____________    Take the following medications with a sip of water: Follow your Doctor's pre procedure instructions regarding medications    Do not eat or drink anything after 12:00 midnight prior to your surgery. This includes water chewing gum, mints and ice chips. You may brush your teeth and gargle the morning of your surgery, but do not swallow the water     Please see your family doctor/pediatrician for a history and physical and/or concerning medications. Bring any test results/reports from your physicians office. If you are under the care of a heart doctor or specialist doctor, please be aware that you may be asked to them for clearance    You may be asked to stop blood thinners such as Coumadin, Plavix, Fragmin, Lovenox, etc., or any anti-inflammatories such as:  Aspirin, Ibuprofen, Advil, Naproxen prior to your surgery. We also ask that you stop any OTC medications such as fish oil, vitamin E, glucosamine, garlic, Multivitamins, COQ 10, etc.    We ask that you do not smoke 24 hours prior to surgery  We ask that you do not  drink any alcoholic beverages 24 hours prior to surgery     You must make arrangements for a responsible adult to take you home after your surgery. For your safety you will not be allowed to leave alone or drive yourself home. Your surgery will be cancelled if you do not have a ride home. Also for your safety, it is strongly suggested that someone stay with you the first 24 hours after your surgery. A parent or legal guardian must accompany a child scheduled for surgery and plan to stay at the hospital until the child is discharged. Please do not bring other children with you. For your comfort, please wear simple loose fitting clothing to the hospital.  Please do not bring valuables.     Do not wear any make-up or nail polish on your fingers or toes      For your safety, please do not wear any jewelry or body piercing's on the day of surgery. All jewelry must be removed. If you have dentures, they will be removed before going to operating room. For your convenience, we will provide you with a container. If you wear contact lenses or glasses, they will be removed, please bring a case for them. If you have a living will and a durable power of  for healthcare, please bring in a copy. As part of our patient safety program to minimize surgical site infections, we ask you to do the following:    · Please notify your surgeon if you develop any illness between         now and the  day of your surgery. · This includes a cough, cold, fever, sore throat, nausea,         or vomiting, and diarrhea, etc.  ·  Please notify your surgeon if you experience dizziness, shortness         of breath or blurred vision between now and the time of your surgery. Do not shave your operative site 96 hours prior to surgery. For face and neck surgery, men may use an electric razor 48 hours   prior to surgery. You may shower the night before surgery or the morning of   your surgery with an antibacterial soap. You will need to bring a photo ID and insurance card    [facility] has an onsite pharmacy, would you like to utilize our pharmacy     If you will be staying overnight and use a C-pap machine, please bring   your C-pap to hospital     Our goal is to provide you with excellent care, therefore, visitors will be limited to two(2) in the room at a time so that we may focus on providing this care for you. Please contact pre-admission testing if you have any further questions. [facility] phone number:  [phone] PAT fax number:  [phone]  Please note these are generalized instructions for all surgical cases, you may be provided with more specific instructions according to your surgery.   Preoperative Screening for Elective Surgery/Invasive Procedures While COVID-19 present in the community     Have you tested positive or have been told to self-isolate for COVID-19 like symptoms within the past 28 days? no   Do you currently have any of the following symptoms?no  o Fever >100.0 F or 99.9 F in immunocompromised patients?no  o New onset cough, shortness of breath or difficulty breathing?no  o New onset sore throat, myalgia (muscle aches and pains), headache, loss of taste/smell or diarrhea? no   Have you had a potential exposure to COVID-19 within the past 14 days by:  o Close contact with a confirmed case?no  o Close contact with a healthcare worker,  or essential infrastructure worker (grocery store, [employer], gas station, public utilities or transportation)? no  Do you reside in a congregate setting such as; skilled nursing facility, adult home, correctional facility, homeless shelter or other institutional setting? noHave you had recent travel to a known COVID-19 hotspot? no  Indicate if the patient has a positive screen by answering yes to one or more of the above questions. Patients who test positive or screen positive prior to surgery or on the day of surgery should be evaluated in conjunction with the surgeon/proceduralist/anesthesiologist to determine the urgency of the procedure.

## 2020-10-30 LAB — SARS-COV-2, NAA: NOT DETECTED

## 2020-10-30 NOTE — RESULT ENCOUNTER NOTE

## 2020-11-02 ENCOUNTER — ANESTHESIA EVENT (OUTPATIENT)
Dept: ENDOSCOPY | Age: 85
End: 2020-11-02
Payer: MEDICARE

## 2020-11-03 ENCOUNTER — ANESTHESIA (OUTPATIENT)
Dept: ENDOSCOPY | Age: 85
End: 2020-11-03
Payer: MEDICARE

## 2020-11-03 ENCOUNTER — HOSPITAL ENCOUNTER (OUTPATIENT)
Age: 85
Setting detail: OUTPATIENT SURGERY
Discharge: HOME | End: 2020-11-03
Attending: INTERNAL MEDICINE | Admitting: INTERNAL MEDICINE
Payer: MEDICARE

## 2020-11-03 VITALS
SYSTOLIC BLOOD PRESSURE: 92 MMHG | RESPIRATION RATE: 16 BRPM | OXYGEN SATURATION: 96 % | DIASTOLIC BLOOD PRESSURE: 51 MMHG

## 2020-11-03 VITALS
WEIGHT: 169 LBS | DIASTOLIC BLOOD PRESSURE: 72 MMHG | HEIGHT: 74 IN | TEMPERATURE: 97 F | OXYGEN SATURATION: 98 % | HEART RATE: 74 BPM | BODY MASS INDEX: 21.69 KG/M2 | SYSTOLIC BLOOD PRESSURE: 143 MMHG | RESPIRATION RATE: 18 BRPM

## 2020-11-03 PROCEDURE — 2709999900 HC NON-CHARGEABLE SUPPLY: Performed by: INTERNAL MEDICINE

## 2020-11-03 PROCEDURE — 3700000000 HC ANESTHESIA ATTENDED CARE: Performed by: INTERNAL MEDICINE

## 2020-11-03 PROCEDURE — 7100000010 HC PHASE II RECOVERY - FIRST 15 MIN: Performed by: INTERNAL MEDICINE

## 2020-11-03 PROCEDURE — 88305 TISSUE EXAM BY PATHOLOGIST: CPT

## 2020-11-03 PROCEDURE — 2500000003 HC RX 250 WO HCPCS: Performed by: NURSE ANESTHETIST, CERTIFIED REGISTERED

## 2020-11-03 PROCEDURE — 3700000001 HC ADD 15 MINUTES (ANESTHESIA): Performed by: INTERNAL MEDICINE

## 2020-11-03 PROCEDURE — 3609010200 HC COLONOSCOPY ABLATION TUMOR POLYP/OTHER LES: Performed by: INTERNAL MEDICINE

## 2020-11-03 PROCEDURE — 2720000010 HC SURG SUPPLY STERILE: Performed by: INTERNAL MEDICINE

## 2020-11-03 PROCEDURE — 7100000011 HC PHASE II RECOVERY - ADDTL 15 MIN: Performed by: INTERNAL MEDICINE

## 2020-11-03 PROCEDURE — 6360000002 HC RX W HCPCS: Performed by: NURSE ANESTHETIST, CERTIFIED REGISTERED

## 2020-11-03 PROCEDURE — 3609010300 HC COLONOSCOPY W/BIOPSY SINGLE/MULTIPLE: Performed by: INTERNAL MEDICINE

## 2020-11-03 PROCEDURE — 2580000003 HC RX 258: Performed by: ANESTHESIOLOGY

## 2020-11-03 PROCEDURE — 7100000001 HC PACU RECOVERY - ADDTL 15 MIN: Performed by: INTERNAL MEDICINE

## 2020-11-03 PROCEDURE — 7100000000 HC PACU RECOVERY - FIRST 15 MIN: Performed by: INTERNAL MEDICINE

## 2020-11-03 RX ORDER — MORPHINE SULFATE 2 MG/ML
2 INJECTION, SOLUTION INTRAMUSCULAR; INTRAVENOUS EVERY 5 MIN PRN
Status: DISCONTINUED | OUTPATIENT
Start: 2020-11-03 | End: 2020-11-03 | Stop reason: HOSPADM

## 2020-11-03 RX ORDER — FENTANYL CITRATE 50 UG/ML
50 INJECTION, SOLUTION INTRAMUSCULAR; INTRAVENOUS EVERY 5 MIN PRN
Status: DISCONTINUED | OUTPATIENT
Start: 2020-11-03 | End: 2020-11-03 | Stop reason: HOSPADM

## 2020-11-03 RX ORDER — FENTANYL CITRATE 50 UG/ML
25 INJECTION, SOLUTION INTRAMUSCULAR; INTRAVENOUS EVERY 5 MIN PRN
Status: DISCONTINUED | OUTPATIENT
Start: 2020-11-03 | End: 2020-11-03 | Stop reason: HOSPADM

## 2020-11-03 RX ORDER — LIDOCAINE HYDROCHLORIDE 20 MG/ML
INJECTION, SOLUTION EPIDURAL; INFILTRATION; INTRACAUDAL; PERINEURAL PRN
Status: DISCONTINUED | OUTPATIENT
Start: 2020-11-03 | End: 2020-11-03 | Stop reason: SDUPTHER

## 2020-11-03 RX ORDER — MORPHINE SULFATE 2 MG/ML
1 INJECTION, SOLUTION INTRAMUSCULAR; INTRAVENOUS EVERY 5 MIN PRN
Status: DISCONTINUED | OUTPATIENT
Start: 2020-11-03 | End: 2020-11-03 | Stop reason: HOSPADM

## 2020-11-03 RX ORDER — OXYCODONE HYDROCHLORIDE 5 MG/1
5 TABLET ORAL PRN
Status: DISCONTINUED | OUTPATIENT
Start: 2020-11-03 | End: 2020-11-03 | Stop reason: HOSPADM

## 2020-11-03 RX ORDER — LIDOCAINE HYDROCHLORIDE 20 MG/ML
INJECTION, SOLUTION EPIDURAL; INFILTRATION; INTRACAUDAL; PERINEURAL PRN
Status: DISCONTINUED | OUTPATIENT
Start: 2020-11-03 | End: 2020-11-03

## 2020-11-03 RX ORDER — SODIUM CHLORIDE 0.9 % (FLUSH) 0.9 %
10 SYRINGE (ML) INJECTION PRN
Status: DISCONTINUED | OUTPATIENT
Start: 2020-11-03 | End: 2020-11-03 | Stop reason: HOSPADM

## 2020-11-03 RX ORDER — OXYCODONE HYDROCHLORIDE 10 MG/1
10 TABLET ORAL PRN
Status: DISCONTINUED | OUTPATIENT
Start: 2020-11-03 | End: 2020-11-03 | Stop reason: HOSPADM

## 2020-11-03 RX ORDER — PROPOFOL 10 MG/ML
INJECTION, EMULSION INTRAVENOUS PRN
Status: DISCONTINUED | OUTPATIENT
Start: 2020-11-03 | End: 2020-11-03 | Stop reason: SDUPTHER

## 2020-11-03 RX ORDER — ONDANSETRON 2 MG/ML
4 INJECTION INTRAMUSCULAR; INTRAVENOUS
Status: DISCONTINUED | OUTPATIENT
Start: 2020-11-03 | End: 2020-11-03 | Stop reason: HOSPADM

## 2020-11-03 RX ORDER — MEPERIDINE HYDROCHLORIDE 25 MG/ML
12.5 INJECTION INTRAMUSCULAR; INTRAVENOUS; SUBCUTANEOUS EVERY 5 MIN PRN
Status: DISCONTINUED | OUTPATIENT
Start: 2020-11-03 | End: 2020-11-03 | Stop reason: HOSPADM

## 2020-11-03 RX ORDER — SODIUM CHLORIDE 0.9 % (FLUSH) 0.9 %
10 SYRINGE (ML) INJECTION EVERY 12 HOURS SCHEDULED
Status: DISCONTINUED | OUTPATIENT
Start: 2020-11-03 | End: 2020-11-03 | Stop reason: HOSPADM

## 2020-11-03 RX ORDER — PROPOFOL 10 MG/ML
INJECTION, EMULSION INTRAVENOUS CONTINUOUS PRN
Status: DISCONTINUED | OUTPATIENT
Start: 2020-11-03 | End: 2020-11-03 | Stop reason: SDUPTHER

## 2020-11-03 RX ORDER — SODIUM CHLORIDE 9 MG/ML
INJECTION, SOLUTION INTRAVENOUS CONTINUOUS
Status: DISCONTINUED | OUTPATIENT
Start: 2020-11-03 | End: 2020-11-03 | Stop reason: HOSPADM

## 2020-11-03 RX ADMIN — LIDOCAINE HYDROCHLORIDE 60 MG: 20 INJECTION, SOLUTION EPIDURAL; INFILTRATION; INTRACAUDAL; PERINEURAL at 11:06

## 2020-11-03 RX ADMIN — SODIUM CHLORIDE: 9 INJECTION, SOLUTION INTRAVENOUS at 10:01

## 2020-11-03 RX ADMIN — SODIUM CHLORIDE: 9 INJECTION, SOLUTION INTRAVENOUS at 10:56

## 2020-11-03 RX ADMIN — PROPOFOL 60 MG: 10 INJECTION, EMULSION INTRAVENOUS at 11:06

## 2020-11-03 RX ADMIN — PROPOFOL 140 MCG/KG/MIN: 10 INJECTION, EMULSION INTRAVENOUS at 11:06

## 2020-11-03 ASSESSMENT — PULMONARY FUNCTION TESTS
PIF_VALUE: 0

## 2020-11-03 ASSESSMENT — PAIN SCALES - GENERAL
PAINLEVEL_OUTOF10: 0

## 2020-11-03 NOTE — ANESTHESIA PRE PROCEDURE
Department of Anesthesiology  Preprocedure Note       Name:  Carrillo Bridges   Age:  80 y.o.  :  1933                                          MRN:  [id number]         Date:  11/3/2020      Surgeon: Jayme Malin):  Yeimy Mcleod MD    Procedure: Procedure(s):  COLONOSCOPY    Medications prior to admission:   Prior to Admission medications    Medication Sig Start Date End Date Taking? Authorizing Provider   aspirin 81 MG EC tablet Take 81 mg by mouth daily   Yes Historical Provider, MD   rosuvastatin (CRESTOR) 10 MG tablet TAKE ONE TABLET BY MOUTH EVERY NIGHT AT BEDTIME 20   Shanda Olivares DO   polyethylene glycol (GLYCOLAX) packet Take 17 g by mouth    Historical Provider, MD   omeprazole (PRILOSEC) 20 MG capsule Take 20 mg by mouth daily as needed. 3 times a week    Historical Provider, MD       Current medications:    Current Facility-Administered Medications   Medication Dose Route Frequency Provider Last Rate Last Dose    0.9 % sodium chloride infusion   Intravenous Continuous Austin Ricks MD        sodium chloride flush 0.9 % injection 10 mL  10 mL Intravenous 2 times per day Austin Ricks MD        sodium chloride flush 0.9 % injection 10 mL  10 mL Intravenous PRN Austin Ricks MD           Allergies:     Allergies   Allergen Reactions    Cephalosporins     Cefadroxil Hives       Problem List:    Patient Active Problem List   Diagnosis Code    Hyperlipidemia E78.5    Anxiety disorder F41.9    HTN (hypertension) I10    Actinic keratosis L57.0    Benign neoplasm of other specified sites of skin D23.9    Blood in stool K92.1    Constipation K59.00    History of nonmelanoma skin cancer Z85.828    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    Scleral hemorrhage of right eye H11.31    Encounter to establish care Z76.89       Past Medical History:        Diagnosis Date    Collapsed lung     H/O    Hyperlipidemia     Hypertension     Leg fracture, right     H/O    Prostate [facility])        Past Surgical History:        Procedure Laterality Date    APPENDECTOMY      TONSILLECTOMY         Social History:    Social History     Tobacco Use    Smoking status: Former Smoker    Smokeless tobacco: Never Used   Substance Use Topics    Alcohol use: No                                Counseling given: Not Answered      Vital Signs (Current):   Vitals:    10/29/20 1016   Weight: 169 lb (76.7 kg)   Height: 6' 1.5\" (1.867 m)                                              BP Readings from Last 3 Encounters:   07/02/20 (!) 158/80   06/04/20 (!) 160/70   01/21/20 (!) 146/80       NPO Status:                                                                                 BMI:   Wt Readings from Last 3 Encounters:   10/29/20 169 lb (76.7 kg)   07/02/20 167 lb (75.8 kg)   06/04/20 170 lb (77.1 kg)     Body mass index is 21.99 kg/m². CBC:   Lab Results   Component Value Date    WBC 7.0 01/21/2020    RBC 4.26 01/21/2020    HGB 14.0 01/21/2020    HCT 41.9 01/21/2020    MCV 98.4 01/21/2020    RDW 13.6 01/21/2020     01/21/2020       CMP:   Lab Results   Component Value Date     10/08/2018    K 3.5 10/08/2018     10/08/2018    CO2 22 10/08/2018    BUN 14 10/08/2018    CREATININE 1.0 10/08/2018    GFRAA >60 10/08/2018    GFRAA >60 08/24/2012    AGRATIO 1.7 10/07/2018    LABGLOM >60 10/08/2018    GLUCOSE 186 10/08/2018    PROT 6.7 10/07/2018    PROT 7.1 08/05/2011    CALCIUM 9.0 10/08/2018    BILITOT 0.5 10/07/2018    ALKPHOS 54 10/07/2018    AST 21 10/07/2018    ALT 21 10/07/2018       POC Tests: No results for input(s): POCGLU, POCNA, POCK, POCCL, POCBUN, POCHEMO, POCHCT in the last 72 hours.     Coags: No results found for: PROTIME, INR, APTT    HCG (If Applicable): No results found for: PREGTESTUR, PREGSERUM, HCG, HCGQUANT     ABGs: No results found for: PHART, PO2ART, FKU1PYM, OPG0ICX, BEART, U5ORATEI     Type & Screen (If Applicable):  No results found for: LABABO, [address]    Drug/Infectious Status (If Applicable):  No results found for: HIV, HEPCAB    COVID-19 Screening (If Applicable):   Lab Results   Component Value Date    COVID19 NOT DETECTED 10/29/2020         Anesthesia Evaluation  Patient summary reviewed and Nursing notes reviewed no history of anesthetic complications:   Airway: Mallampati: II  TM distance: >3 FB   Neck ROM: full  Mouth opening: > = 3 FB Dental:    (+) upper dentures  Comment: Missing several lower teeth as well    Pulmonary:Negative Pulmonary ROS                             ROS comment: Ex smoker   Cardiovascular:  Exercise tolerance: good (>4 METS),   (+) hypertension:, hyperlipidemia    (-) pacemaker, valvular problems/murmurs, past MI, CAD, CABG/stent, dysrhythmias,  angina,  CHF, orthopnea, PND and  GUILLEN      Rhythm: regular                      Neuro/Psych:   (+) psychiatric history:   (-) seizures, neuromuscular disease, TIA, CVA, headaches and depression/anxiety            GI/Hepatic/Renal:   (+) bowel prep,      (-) hiatal hernia, GERD, PUD, hepatitis, liver disease and no renal disease       Endo/Other: Negative Endo/Other ROS                    Abdominal:           Vascular: negative vascular ROS. Anesthesia Plan      MAC     ASA 3       Induction: intravenous. MIPS: Prophylactic antiemetics administered. Anesthetic plan and risks discussed with patient and spouse. Plan discussed with CRNA. Сергей Montilla MD   11/3/2020        This pre-anesthesia assessment may be used as a history and physical.    DOS STAFF ADDENDUM:    Pt seen and examined, chart reviewed (including anesthesia, drug and allergy history). No interval changes to history and physical examination. Anesthetic plan, risks, benefits, alternatives, and personnel involved discussed with patient. Patient verbalized an understanding and agrees to proceed.       Сергей Montilla MD  November 3, 2020  9:57 AM

## 2020-11-03 NOTE — ANESTHESIA POSTPROCEDURE EVALUATION
Department of Anesthesiology  Postprocedure Note    Patient: Jarvis Fletcher  MRN: [id number]  YOB: 1933  Date of evaluation: 11/3/2020  Time:  2:33 PM     Procedure Summary     Date:  11/03/20 Room / Location:  [address] [place] [address]    Anesthesia Start:  1103 Anesthesia Stop:  1140    Procedures:       COLONOSCOPY with ABLATION (N/A )      COLONOSCOPY WITH BIOPSY Diagnosis:       Rectal bleeding      (RECTAL BLEEDING)    Surgeon:  Hien Amado MD Responsible Provider:  Vianca Martinez MD    Anesthesia Type:  MAC ASA Status:  3          Anesthesia Type: MAC    Jasmin Phase I: Jasmin Score: 10    Jasmin Phase II: Jasmin Score: 10    Last vitals: Reviewed and per EMR flowsheets.        Anesthesia Post Evaluation    Patient location during evaluation: PACU  Patient participation: complete - patient participated  Level of consciousness: awake and alert  Pain score: 0  Airway patency: patent  Nausea & Vomiting: no nausea and no vomiting  Complications: no  Cardiovascular status: blood pressure returned to baseline  Respiratory status: acceptable  Hydration status: euvolemic

## 2020-11-03 NOTE — PROGRESS NOTES
Tolerating oral intake and being up in chair. No complaints. Discharge instructions given to patient and wife. Verbalize understanding.

## 2020-11-03 NOTE — OP NOTE
descending, sigmoid colon, and rectum. Careful circumferential examination of the mucosa in these areas demonstrated two ascending colon AVM's, 5 and 8 mm in size, obliterated with APC. Mild sigmoid colon diverticulosis was present. Two healing ulceration from prior hemorrhoid banding were seen in the distal rectum. Non-bleeding proctitis in the distal rectum, consistent with radiation proctitis, treated with APC. The scope was then withdrawn into the rectum and retroflexed. The retroflexed view of the anal verge and rectum demonstrates internal hemorrhoids, and radiation proctitis s/p APC. The scope was straightened, the colon was decompressed and the scope was withdrawn from the patient. The patient tolerated the procedure well and was taken to the PACU in good condition. Estimated blood loss: None    Impression:  See post-procedure diagnoses. Recommendations:   - Resume regular medications. - Resume diet as tolerated. - No repeat colonoscopy recommended. Can consider further flexible sigmoidoscopy for further rectal APC and further hemorrhoid banding if bleeding persists.     MITRA [address]  11/3/2020  [phone]

## 2020-11-03 NOTE — H&P
Pre-operative History and Physical    Patient: Eugenio Yin  : 1933  Acct#:     Intended Procedure:  Colonoscopy    HISTORY OF PRESENT ILLNESS:  The patient is a 80 y.o. male  who presents for colonoscopy due to rectal bleeding. Past Medical History:        Diagnosis Date    Collapsed lung     H/O    Hyperlipidemia     Hypertension     Leg fracture, right     H/O    Prostate cancer ([address]      Past Surgical History:        Procedure Laterality Date    APPENDECTOMY      TONSILLECTOMY       Medications Prior to Admission:   No current facility-administered medications on file prior to encounter. Current Outpatient Medications on File Prior to Encounter   Medication Sig Dispense Refill    aspirin 81 MG EC tablet Take 81 mg by mouth daily      rosuvastatin (CRESTOR) 10 MG tablet TAKE ONE TABLET BY MOUTH EVERY NIGHT AT BEDTIME 90 tablet 1    polyethylene glycol (GLYCOLAX) packet Take 17 g by mouth      omeprazole (PRILOSEC) 20 MG capsule Take 20 mg by mouth daily as needed. 3 times a week          Allergies:  Cephalosporins and Cefadroxil    Social History:   TOBACCO:   reports that he has quit smoking. He has never used smokeless tobacco.  ETOH:   reports no history of alcohol use. DRUGS:   reports no history of drug use. PHYSICAL EXAM:      Vital Signs: Ht 6' 1.5\" (1.867 m)   Wt 169 lb (76.7 kg)   BMI 21.99 kg/m²    Airway: No stridor or wheezing noted. Good air movement  Pulmonary: without wheezes.   Clear to auscultation  Cardiac:regular rate and rhythm without loud murmurs  Abdomen:soft, nontender,  Bowel sounds present    Pre-Procedure Assessment / Plan:  1) Colonoscopy    ASA Grade:  ASA 3 - Patient with moderate systemic disease with functional limitations  Mallampati Classification:  Class II    Level of Sedation Plan:MAC    Post Procedure plan: Return to same level of care    I assessed the patient and find that the patient is in satisfactory condition to proceed

## 2021-05-19 NOTE — PROGRESS NOTES
Larisa Moody from MD [place] office contacted and states she will send message to Dr Amie Amador MA. States pt will need to be cancelled.

## 2021-05-24 ENCOUNTER — ANESTHESIA EVENT (OUTPATIENT)
Dept: ENDOSCOPY | Age: 86
End: 2021-05-24
Payer: MEDICARE

## 2021-05-25 ENCOUNTER — HOSPITAL ENCOUNTER (OUTPATIENT)
Age: 86
Setting detail: OUTPATIENT SURGERY
Discharge: HOME | End: 2021-05-25
Attending: INTERNAL MEDICINE | Admitting: INTERNAL MEDICINE
Payer: MEDICARE

## 2021-05-25 ENCOUNTER — ANESTHESIA (OUTPATIENT)
Dept: ENDOSCOPY | Age: 86
End: 2021-05-25
Payer: MEDICARE

## 2021-05-25 VITALS
HEART RATE: 82 BPM | RESPIRATION RATE: 18 BRPM | DIASTOLIC BLOOD PRESSURE: 75 MMHG | BODY MASS INDEX: 22.4 KG/M2 | OXYGEN SATURATION: 99 % | SYSTOLIC BLOOD PRESSURE: 152 MMHG | WEIGHT: 169 LBS | TEMPERATURE: 97.3 F | HEIGHT: 73 IN

## 2021-05-25 VITALS — DIASTOLIC BLOOD PRESSURE: 50 MMHG | OXYGEN SATURATION: 99 % | SYSTOLIC BLOOD PRESSURE: 99 MMHG

## 2021-05-25 DIAGNOSIS — K62.5 RECTAL BLEEDING: ICD-10-CM

## 2021-05-25 PROCEDURE — 3700000000 HC ANESTHESIA ATTENDED CARE: Performed by: INTERNAL MEDICINE

## 2021-05-25 PROCEDURE — 2580000003 HC RX 258: Performed by: ANESTHESIOLOGY

## 2021-05-25 PROCEDURE — 2580000003 HC RX 258: Performed by: NURSE ANESTHETIST, CERTIFIED REGISTERED

## 2021-05-25 PROCEDURE — 2709999900 HC NON-CHARGEABLE SUPPLY: Performed by: INTERNAL MEDICINE

## 2021-05-25 PROCEDURE — 3700000001 HC ADD 15 MINUTES (ANESTHESIA): Performed by: INTERNAL MEDICINE

## 2021-05-25 PROCEDURE — 6360000002 HC RX W HCPCS: Performed by: NURSE ANESTHETIST, CERTIFIED REGISTERED

## 2021-05-25 PROCEDURE — 88305 TISSUE EXAM BY PATHOLOGIST: CPT

## 2021-05-25 PROCEDURE — 7100000000 HC PACU RECOVERY - FIRST 15 MIN: Performed by: INTERNAL MEDICINE

## 2021-05-25 PROCEDURE — 3609010600 HC COLONOSCOPY POLYPECTOMY SNARE/COLD BIOPSY: Performed by: INTERNAL MEDICINE

## 2021-05-25 PROCEDURE — 7100000010 HC PHASE II RECOVERY - FIRST 15 MIN: Performed by: INTERNAL MEDICINE

## 2021-05-25 PROCEDURE — 7100000011 HC PHASE II RECOVERY - ADDTL 15 MIN: Performed by: INTERNAL MEDICINE

## 2021-05-25 PROCEDURE — 7100000001 HC PACU RECOVERY - ADDTL 15 MIN: Performed by: INTERNAL MEDICINE

## 2021-05-25 PROCEDURE — 2500000003 HC RX 250 WO HCPCS: Performed by: NURSE ANESTHETIST, CERTIFIED REGISTERED

## 2021-05-25 RX ORDER — SODIUM CHLORIDE 9 MG/ML
INJECTION, SOLUTION INTRAVENOUS CONTINUOUS
Status: DISCONTINUED | OUTPATIENT
Start: 2021-05-25 | End: 2021-05-25 | Stop reason: HOSPADM

## 2021-05-25 RX ORDER — LIDOCAINE HYDROCHLORIDE 20 MG/ML
INJECTION, SOLUTION EPIDURAL; INFILTRATION; INTRACAUDAL; PERINEURAL PRN
Status: DISCONTINUED | OUTPATIENT
Start: 2021-05-25 | End: 2021-05-25 | Stop reason: SDUPTHER

## 2021-05-25 RX ORDER — SODIUM CHLORIDE 9 MG/ML
25 INJECTION, SOLUTION INTRAVENOUS PRN
Status: DISCONTINUED | OUTPATIENT
Start: 2021-05-25 | End: 2021-05-25 | Stop reason: HOSPADM

## 2021-05-25 RX ORDER — GLYCOPYRROLATE 0.2 MG/ML
INJECTION INTRAMUSCULAR; INTRAVENOUS PRN
Status: DISCONTINUED | OUTPATIENT
Start: 2021-05-25 | End: 2021-05-25 | Stop reason: SDUPTHER

## 2021-05-25 RX ORDER — SODIUM CHLORIDE 0.9 % (FLUSH) 0.9 %
10 SYRINGE (ML) INJECTION EVERY 12 HOURS SCHEDULED
Status: DISCONTINUED | OUTPATIENT
Start: 2021-05-25 | End: 2021-05-25 | Stop reason: HOSPADM

## 2021-05-25 RX ORDER — SODIUM CHLORIDE 9 MG/ML
INJECTION, SOLUTION INTRAVENOUS CONTINUOUS PRN
Status: DISCONTINUED | OUTPATIENT
Start: 2021-05-25 | End: 2021-05-25 | Stop reason: SDUPTHER

## 2021-05-25 RX ORDER — SODIUM CHLORIDE 0.9 % (FLUSH) 0.9 %
10 SYRINGE (ML) INJECTION PRN
Status: DISCONTINUED | OUTPATIENT
Start: 2021-05-25 | End: 2021-05-25 | Stop reason: HOSPADM

## 2021-05-25 RX ORDER — PROPOFOL 10 MG/ML
INJECTION, EMULSION INTRAVENOUS PRN
Status: DISCONTINUED | OUTPATIENT
Start: 2021-05-25 | End: 2021-05-25 | Stop reason: SDUPTHER

## 2021-05-25 RX ORDER — PROPOFOL 10 MG/ML
INJECTION, EMULSION INTRAVENOUS CONTINUOUS PRN
Status: DISCONTINUED | OUTPATIENT
Start: 2021-05-25 | End: 2021-05-25 | Stop reason: SDUPTHER

## 2021-05-25 RX ADMIN — PROPOFOL 180 MCG/KG/MIN: 10 INJECTION, EMULSION INTRAVENOUS at 11:06

## 2021-05-25 RX ADMIN — PROPOFOL 80 MG: 10 INJECTION, EMULSION INTRAVENOUS at 11:06

## 2021-05-25 RX ADMIN — LIDOCAINE HYDROCHLORIDE 80 MG: 20 INJECTION, SOLUTION EPIDURAL; INFILTRATION; INTRACAUDAL; PERINEURAL at 11:06

## 2021-05-25 RX ADMIN — GLYCOPYRROLATE 0.1 MG: 0.2 INJECTION, SOLUTION INTRAMUSCULAR; INTRAVENOUS at 11:02

## 2021-05-25 RX ADMIN — SODIUM CHLORIDE: 9 INJECTION, SOLUTION INTRAVENOUS at 10:12

## 2021-05-25 RX ADMIN — SODIUM CHLORIDE: 9 INJECTION, SOLUTION INTRAVENOUS at 11:02

## 2021-05-25 ASSESSMENT — PULMONARY FUNCTION TESTS
PIF_VALUE: 1

## 2021-05-25 ASSESSMENT — PAIN SCALES - GENERAL: PAINLEVEL_OUTOF10: 0

## 2021-05-25 ASSESSMENT — PAIN - FUNCTIONAL ASSESSMENT: PAIN_FUNCTIONAL_ASSESSMENT: 0-10

## 2021-05-25 NOTE — ANESTHESIA PRE PROCEDURE
121 01/18/2021       CMP:   Lab Results   Component Value Date     01/18/2021    K 4.3 01/18/2021    K 3.5 10/08/2018     01/18/2021    CO2 26 01/18/2021    BUN 9 01/18/2021    CREATININE 1.1 01/18/2021    GFRAA >60 01/18/2021    GFRAA >60 08/24/2012    AGRATIO 2.4 01/18/2021    LABGLOM >60 01/18/2021    GLUCOSE 113 01/18/2021    PROT 6.7 01/18/2021    PROT 7.1 08/05/2011    CALCIUM 9.5 01/18/2021    BILITOT 0.7 01/18/2021    ALKPHOS 64 01/18/2021    AST 17 01/18/2021    ALT 16 01/18/2021       POC Tests: No results for input(s): POCGLU, POCNA, POCK, POCCL, POCBUN, POCHEMO, POCHCT in the last 72 hours.     Coags: No results found for: PROTIME, INR, APTT    HCG (If Applicable): No results found for: PREGTESTUR, PREGSERUM, HCG, HCGQUANT     ABGs: No results found for: PHART, PO2ART, DTF5EYE, QCD9GRE, BEART, B3UMXWWI     Type & Screen (If Applicable):  No results found for: LABABO, LABRH    Drug/Infectious Status (If Applicable):  No results found for: HIV, HEPCAB    COVID-19 Screening (If Applicable):   Lab Results   Component Value Date    COVID19 NOT DETECTED 10/29/2020         Anesthesia Evaluation  Patient summary reviewed and Nursing notes reviewed no history of anesthetic complications:   Airway: Mallampati: II  TM distance: >3 FB   Neck ROM: full  Mouth opening: > = 3 FB Dental:    (+) upper dentures  Comment: Missing several lower teeth as well    Pulmonary:Negative Pulmonary ROS                             ROS comment: Ex smoker   Cardiovascular:  Exercise tolerance: good (>4 METS),   (+) hypertension:, hyperlipidemia    (-) pacemaker, valvular problems/murmurs, past MI, CAD, CABG/stent, dysrhythmias,  angina,  CHF, orthopnea, PND and  GUILLEN    ECG reviewed      Echocardiogram reviewed                  Neuro/Psych:   (+) psychiatric history:   (-) seizures, neuromuscular disease, TIA, CVA, headaches and depression/anxiety            GI/Hepatic/Renal:   (+) bowel prep,      (-) hiatal hernia, GERD,

## 2021-05-25 NOTE — PROGRESS NOTES
Pt awake, alert, and oriented. States no pain. VSS. Edward Sifuentes. Abdomen soft. Pt ready for transfer to  2.

## 2021-05-25 NOTE — H&P
Pre-operative History and Physical    Patient: Valerie Rene  : 1933  Acct#:     Intended Procedure:  Colonoscopy    HISTORY OF PRESENT ILLNESS:  The patient is a 80 y.o. 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 Past Medical History:        Diagnosis Date    Collapsed lung     H/O    Hyperlipidemia     Hypertension     Leg fracture, right     H/O    Post-herpetic polyneuropathy     Prostate cancer [facility])      Past Surgical History:        Procedure Laterality Date    APPENDECTOMY      COLONOSCOPY  2020    COLONOSCOPY N/A 11/3/2020    COLONOSCOPY with ABLATION performed by Yusuf Hoover MD at [address]  11/3/2020    COLONOSCOPY WITH BIOPSY performed by Yusuf Hoover MD at [facility]       Medications Prior to Admission:   No current facility-administered medications on file prior to encounter. Current Outpatient Medications on File Prior to Encounter   Medication Sig Dispense Refill    nystatin-triamcinolone (MYCOLOG II) 435769-8.8 UNIT/GM-% cream Apply topically 2 times daily. (Patient taking differently: 2 times daily as needed Apply topically 2 times daily. ) 1 Tube 0    aspirin 81 MG EC tablet Take 81 mg by mouth daily      polyethylene glycol (GLYCOLAX) packet Take 17 g by mouth      rosuvastatin (CRESTOR) 10 MG tablet TAKE ONE TABLET BY MOUTH EVERY NIGHT AT BEDTIME 90 tablet 0    omeprazole (PRILOSEC) 20 MG capsule Take 20 mg by mouth daily as needed. 3 times a week          Allergies:  Cephalosporins and Cefadroxil    Social History:   TOBACCO:   reports that he has quit smoking. He has never used smokeless tobacco.  ETOH:   reports no history of alcohol use. DRUGS:   reports no history of drug use.     PHYSICAL EXAM:      Vital Signs: BP (!) 158/77   Pulse 82   Temp 98 °F (36.7 °C) (Temporal)   Resp 16   Ht 6' 1\" (1.854 m)   Wt 169 lb (76.7 kg)   SpO2 99%   BMI 22.30 kg/m²    Airway: No stridor or wheezing noted. Good air movement  Pulmonary: without wheezes. Clear to auscultation  Cardiac:regular rate and rhythm without loud murmurs  Abdomen:soft, nontender,  Bowel sounds present    Pre-Procedure Assessment / Plan:  1) Colonoscopy    ASA Grade:  ASA 3 - Patient with moderate systemic disease with functional limitations  Mallampati Classification:  Class II    Level of Sedation Plan:MAC    Post Procedure plan: Return to same level of care    I assessed the patient and find that the patient is in satisfactory condition to proceed with the planned procedure and sedation plan. I have explained the risk, benefits, and alternatives to the procedure; the patient understands and agrees to proceed.        Ascencion Wheeler MD  5/25/2021

## 2021-05-25 NOTE — OP NOTE
Colonoscopy Procedure Note      Patient: Jennifer Trammell  : 1933  Acct#:     Procedure: Colonoscopy with polypectomy (snare cautery)    Date:  2021    Surgeon:  Leila Wilhelm MD    Referring Physician:  Miranda Simmons DO    Preoperative Diagnosis:  80 y.o. male  who presents for colonoscopy due to adenomatous polyp at the IC valve possibly requiring EMR. Postoperative Diagnosis:    1.  IC valve which was lipomatous appearing, which on close inspection had a 7 mm adenomatous appearing polyp on the proximal side of the valve. The border of the polyp was identified upon close inspection with white light and NBI, and the polyp was resected with snare and cautery en bloc. 2.  Sigmoid colon diverticulosis  3. Scarring in the distal rectum from prior APC. 4.  Internal hemorrhoids    Consent:  The patient or their legal guardian has signed a consent, and is aware of the potential risks, benefits, alternatives, and potential complications of this procedure. These include, but are not limited to hemorrhage, bleeding, post procedural pain, perforation, phlebitis, aspiration, hypotension, hypoxia, cardiovascular events such as arryhthmia, and possibly death. Additionally, the possibility of missed colonic polyps and interval colon cancer was discussed in the consent. Anesthesia:  MAC    Procedure: An informed consent was obtained from the patient after explanation of indications, benefits, possible risks and complications of the procedure. The patient was then taken to the endoscopy suite, placed in the left lateral decubitus position, and the above IV anesthesia was administered. A digital rectal examination was performed and revealed negative without mass, lesions or tenderness. The Olympus video colonoscope was placed in the patient's rectum under digital direction and advanced to the cecum. The cecum was identified by characteristic anatomy and ballottment.  The ileocecal valve was identified. The preparation was fair. The terminal ileum was not inspected. The scope was then withdrawn back through the cecum, ascending, transverse, descending, sigmoid colon, and rectum. Careful circumferential examination of the mucosa in these areas demonstrated a lipomatous appearing IC valve, which on close inspection had a 7 mm adenomatous appearing polyp on the proximal side of the valve. The border of the polyp was identified upon close inspection with white light and NBI, and the polyp was resected with snare and cautery en bloc. Small and large mouth sigmoid colon diverticulosis was present. Scarring was present in the distal rectum from prior APC. The scope was then withdrawn into the rectum and retroflexed. The retroflexed view of the anal verge and rectum demonstrates internal hemorrhoids. The scope was straightened, the colon was decompressed and the scope was withdrawn from the patient. The patient tolerated the procedure well and was taken to the PACU in good condition. Estimated blood loss: None    ID Type Source Tests Collected by Time Destination   A : Polyp on ileocecal valve Tissue Colon SURGICAL PATHOLOGY Jaxon Patterson MD 5/25/2021 1130      Impression:  See post-procedure diagnoses. Recommendations:    - Follow-up pathology results in 7 days, by calling the office at [phone].  - Resume regular medications. - Resume diet as tolerated. - No repeat colonoscopy necessary given patient age. Should patient wish to proceed with further colon cancer surveillance colonoscopy, would recommend colonoscopy in 3 years.     MITRA [address]  5/25/2021  [phone]

## 2021-06-08 DIAGNOSIS — Z85.46 HISTORY OF PROSTATE CANCER: ICD-10-CM

## 2021-06-08 DIAGNOSIS — E78.2 MIXED HYPERLIPIDEMIA: ICD-10-CM

## 2021-06-08 PROBLEM — D68.9 COAGULATION DEFECT (HCC): Status: RESOLVED | Noted: 2021-06-08 | Resolved: 2021-06-08

## 2021-06-08 PROBLEM — D68.9 COAGULATION DEFECT (HCC): Status: ACTIVE | Noted: 2021-06-08

## 2021-06-08 LAB
CHOLESTEROL, TOTAL: 143 MG/DL (ref 0–199)
HDLC SERPL-MCNC: 42 MG/DL (ref 40–60)
LDL CHOLESTEROL CALCULATED: 78 MG/DL
PROSTATE SPECIFIC ANTIGEN: 0.84 NG/ML (ref 0–4)
TRIGL SERPL-MCNC: 116 MG/DL (ref 0–150)
VLDLC SERPL CALC-MCNC: 23 MG/DL

## 2021-10-22 ENCOUNTER — OFFICE VISIT (OUTPATIENT)
Dept: ORTHOPEDIC SURGERY | Age: 86
End: 2021-10-22
Payer: MEDICARE

## 2021-10-22 VITALS — WEIGHT: 165 LBS | HEIGHT: 73 IN | RESPIRATION RATE: 15 BRPM | BODY MASS INDEX: 21.87 KG/M2

## 2021-10-22 DIAGNOSIS — M65.351 TRIGGER LITTLE FINGER OF RIGHT HAND: Primary | ICD-10-CM

## 2021-10-22 PROCEDURE — G8427 DOCREV CUR MEDS BY ELIG CLIN: HCPCS | Performed by: PHYSICIAN ASSISTANT

## 2021-10-22 PROCEDURE — 99204 OFFICE O/P NEW MOD 45 MIN: CPT | Performed by: PHYSICIAN ASSISTANT

## 2021-10-22 PROCEDURE — 1036F TOBACCO NON-USER: CPT | Performed by: PHYSICIAN ASSISTANT

## 2021-10-22 PROCEDURE — G8420 CALC BMI NORM PARAMETERS: HCPCS | Performed by: PHYSICIAN ASSISTANT

## 2021-10-22 PROCEDURE — 4040F PNEUMOC VAC/ADMIN/RCVD: CPT | Performed by: PHYSICIAN ASSISTANT

## 2021-10-22 PROCEDURE — 20550 NJX 1 TENDON SHEATH/LIGAMENT: CPT | Performed by: PHYSICIAN ASSISTANT

## 2021-10-22 PROCEDURE — 1123F ACP DISCUSS/DSCN MKR DOCD: CPT | Performed by: PHYSICIAN ASSISTANT

## 2021-10-22 PROCEDURE — G8484 FLU IMMUNIZE NO ADMIN: HCPCS | Performed by: PHYSICIAN ASSISTANT

## 2021-10-22 RX ORDER — TRIAMCINOLONE ACETONIDE 40 MG/ML
20 INJECTION, SUSPENSION INTRA-ARTICULAR; INTRAMUSCULAR ONCE
Status: COMPLETED | OUTPATIENT
Start: 2021-10-22 | End: 2021-10-22

## 2021-10-22 RX ADMIN — TRIAMCINOLONE ACETONIDE 20 MG: 40 INJECTION, SUSPENSION INTRA-ARTICULAR; INTRAMUSCULAR at 16:48

## 2021-10-22 NOTE — PATIENT INSTRUCTIONS
Information & Instructions   After Finger, Hand, Wrist, or Elbow Injection    Reyes Fries B. Irena Flatten, MD    You have received an injection of local anesthetic (Bupivicaine without Epinephrine) for comfort & a steroid (Kenalog) for its strong anti-inflammatory effects. In order to give the medication a chance to reduce your inflammation and discomfort, it is recommended that you take it easy for a day or so. You may use your hand and arm as you feel comfortable, but you should avoid highly strenuous activity and heavy use for several days. Relief from the injection will often not begin for several days, and you may not feel full relief for up to one month. It is not uncommon to experience some local discomfort or pain at or around the injection site for a few days. To relieve these symptoms you may do the following if you feel necessary:       Apply ice to the affected area 20 minutes on and 20 minutes off. Do not apply ice directly to the skin. Use a thin layer (T-shirt, pillowcase, towel, etc.) to protect the skin. - If allowed by your other medical physicians, you may take -     2 Tylenol extra strength tablets every 4-6 hours       1-2 Aleve tablets twice a day     2-3 Advil tablets two to three times a day    If you are diabetic, the steroid medication may increase your blood sugar, so you are advised to monitor your sugar more closely so you can adjust it accordingly for a few days following your injection. If you need assistance with the control of you blood sugar, please contact you primary care physician for further advice. I will request that you please call the office one month after your injection at [phone] if you have not experienced relief of your symptoms (unless I have instructed you otherwise). If your injection has given you good relief of you symptoms as expected, then you only need to call the office if your symptoms return.

## 2021-10-22 NOTE — Clinical Note
Dear  William Salazar, DO,    Thank you very much for your referral or Mr. Galina Tamayo to me for evaluation and treatment of his Hand & Wrist condition. I appreciate your confidence in me and thank you for allowing me the opportunity to care for your patients. If I can be of any further assistance to you on this or any other patient, please do not hesitate to contact me. Sincerely,    Vidal Montilla MD

## 2021-10-22 NOTE — PROGRESS NOTES
Mr. Soy Stubbs is a 80 y.o. right handed man  who is seen today in Hand Surgical Consultation at the request of Tammy Tamayo DO. He presents today regarding right symptoms which have been present for approximately 6 weeks. A history of antecedent trauma or injury is Absent. He reports symptoms to include mild pain and stiffness with frequent popping, catching or locking of the right Small Finger. Finger symptoms are not Frequently worse in the morning or overnight. He reports mild pain located at the base of the symptomatic finger(s). Symptoms show no change over time. Previous treatment has included conservative measures. He does not claim relation of his symptoms to his required work activities. He has not undergone any form of testing. I have today reviewed with Soy Stubbs the clinically relevant, past medical history, medications, allergies,  family history, social history, and Review Of Systems & I have documented any details relevant to today's presenting complaints in my history above. Mr. Madhu Gonzalez self-reported past medical history, medications, allergies,  family history, social history, and Review Of Systems have been scanned into the chart under the \"Media\" tab. Physical Exam:  Mr. Madhu Gonzalez most recent vitals:  Vitals  Resp: 15  Height: 6' 1\" (185.4 cm)  Weight: 165 lb (74.8 kg)    He is well nourished, oriented to person, place & time. He demonstrates appropriate mood and affect as well as normal gait and station. Skin: Normal in appearance, Normal Color and Free of Lesions Bilaterally   Digital range of motion is without significant limitation bilaterally. Inducible triggering is noted upon flexion in the right Small Finger. There is not an associated flexion contracture of the PIP joint. No other digit demonstrates evidence for stenosing tenosynovitis bilaterally. Rotational deformity noted on flexion of  Left small finger.    Wrist range of motion is Full and equal bilaterally   Sensation is normal in the Whole Hand bilaterally  Vascular examination reveals normal, good capillary refill and good color bilaterally  Swelling is mild in the symptomatic digit, absent elsewhere bilaterally  There is no evidence of gross joint instability bilaterally. Muscular strength is clinically appropriate bilaterally. Examination for Stenosing Tenosynovitis demonstrates mild tenderness, thickening & nodularity at the A-1 pulley(s) of the Right Small Finger. There is a palpable Nota's Node. There is Inducible triggering on active flexion with pain. No other digits demonstrate evidence of Stenosing Tenosynovitis. There are prominent cords on the palm,bilaterally in the axis of the Ring Finger and Small Finger without involvement of the Ring Finger and Small Finger. Finger joint range of motion is not abnormal; there is not a contracture of the bilaterally Ring Finger and Small Finger. Impression:  Mr. Carol Bonds is showing clinical evidence of Stenosing Tenosynovitis (Trigger Finger) and Dupuytren's contracture and presents requesting further treatment. Plan:  I have had a thorough discussion with Mr. Carol Bonds regarding the treatment options available for his initially presenting Right Small Finger stenosing tenosynovitis, which is causing him functional limitations. I have outlined for Mr. Carol Bonds the benefits and consequences of the various treatment modalities, including a reasonable expectation for the long term success and the likelihood that further more aggressive treatment may be required for his current presenting condition. Based upon our current discussion and a reasonable understating of the options available to him, Mr. Carol Bonds has selected to proceed with  an injection to the right Small Finger Flexor Tendon Sheath.   I have outlined for him the nature of the injection, and the pre, aaron and post injection considerations and the appropriate expectations for this injection. I have clearly explained to him that the above outlined treatment plan should not be expected to 'cure' his stenosing tenosynovitis, but we are rather treating the symptoms with which he presents. He has understood that in order to achieve long lasting relief of his symptoms and to prevent future worsening or further damage, that definitive surgical treatment would be required. Mr. Zahra Awan  voiced an appropriate understanding of our discussion, the options available to him, and of the expectations of his selected  treatment. He did wish to proceed with Right Small Finger Flexor Tendon Sheath injection. Procedure:  right Small Finger Trigger Finger Injection  [first Injection]: After full discussion of the nature of this process and outlining a treatment plan with Mr. Zahra Awan, we discussed the complications, limitations, expectations, alternatives, and risks of injection of the flexor tendon sheath. I have explained the potential for bleeding, infection, potential side effects of the medication, and the remote possibility of damage to surrounding structures as result of the injection. He understood this information well and verbally consented to this treatment. The skin of the symptomatic digit was prepped with Isopropyl Alcohol and under aseptic conditions the flexor tendon sheath was injected with a combination of 1/2 ml of 0.25% Bupivacaine without Epinephrine and 20 mg of Triamcinolone (40 mg/ml). Good filling of the flexor sheath was noted. A dry sterile bandage was applied and the patient tolerated the injection without difficulty. I advised the patient of the expected response, possible reactions and the instructions for care of the hand. I have also discussed with Mr. Zahra Awan the other treatment options available to him for this condition.   We have today selected to proceed with treatment by injection with steroid medication. He and I have agreed that if our current course of Injection treatment does not prove to be effective over the short term future, that he will schedule a follow-up appointment to discuss and select an alternate course of therapy including possibly further conservative treatment or surgical treatment. I have had a thorough discussion with Mr. Devan Morton regarding the treatment options available for his new onset bilateral  Ring Finger and Small Finger Dupuytren's Contracture, which is causing him significant concern and difficulty. I have outlined for Mr. Devan Morton the risk, benefits and consequences of the various treatment modalities, including a reasonable expectation for the long term success of each. We have discussed the likelihood that further more aggressive treatment may be required for his current presenting condition. Based upon our current discussion and a reasonable understating of the options available to him, Mr. Devan Morton has selected to proceed with a conservative plan of treatment consisting of: careful observation, intermittent reassessment of the degree of contracture (Table-Top Test) and maintenance of full range of motion of the uninvolved digits. I have clearly explained to him that the above outlined treatment plan should not be expected to 'cure' his  Dupuytren's Contracture or resolve his current contractures, but we are rather treating the symptoms with which he presents. He has understood that in order to achieve more durable relief of his symptoms and to prevent future worsening or further damage, that definitive treatment, in the form of surgery or enzyme injection, would be required. Mr. Devan Morton  voiced an appropriate understanding of our discussion, the options available to him, and of the expectations of his selected  treatment.     I have also discussed with Mr. Radha Salter Raffaele  the other treatment options available to him  for this condition. We have today selected to proceed with conservative management. He and I have agreed that if our current course of conservative treatment does not prove to be effective over the short term future, that he will schedule a follow-up appointment to discuss and select an alternate course of therapy including possibly injection or surgical treatment. Mr. Flako Sanchez has been given a full verbal list of instructions and precautions related to his present condition. I have asked him to followup with me in the office at the prescribed time. He is also specifically requested to call or return to the office sooner if his symptoms change or worsen prior to the next scheduled appointment.

## 2021-10-26 ENCOUNTER — HOSPITAL ENCOUNTER (OUTPATIENT)
Dept: VASCULAR LAB | Age: 86
Discharge: HOME | End: 2021-10-26
Payer: MEDICARE

## 2021-10-26 DIAGNOSIS — M79.89 RIGHT LEG SWELLING: ICD-10-CM

## 2021-10-26 PROCEDURE — 93971 EXTREMITY STUDY: CPT

## 2021-11-02 DIAGNOSIS — R00.0 TACHYCARDIA: ICD-10-CM

## 2021-11-02 LAB
BASOPHILS ABSOLUTE: 0 K/UL (ref 0–0.2)
BASOPHILS RELATIVE PERCENT: 0.4 %
EOSINOPHILS ABSOLUTE: 0 K/UL (ref 0–0.6)
EOSINOPHILS RELATIVE PERCENT: 0.7 %
HCT VFR BLD CALC: 43 % (ref 40.5–52.5)
HEMOGLOBIN: 14.1 G/DL (ref 13.5–17.5)
LYMPHOCYTES ABSOLUTE: 1.3 K/UL (ref 1–5.1)
LYMPHOCYTES RELATIVE PERCENT: 22.5 %
MCH RBC QN AUTO: 32.3 PG (ref 26–34)
MCHC RBC AUTO-ENTMCNC: 32.7 G/DL (ref 31–36)
MCV RBC AUTO: 98.7 FL (ref 80–100)
MONOCYTES ABSOLUTE: 0.6 K/UL (ref 0–1.3)
MONOCYTES RELATIVE PERCENT: 9.4 %
NEUTROPHILS ABSOLUTE: 3.9 K/UL (ref 1.7–7.7)
NEUTROPHILS RELATIVE PERCENT: 67 %
PDW BLD-RTO: 14.3 % (ref 12.4–15.4)
PLATELET # BLD: 121 K/UL (ref 135–450)
PMV BLD AUTO: 9.9 FL (ref 5–10.5)
RBC # BLD: 4.36 M/UL (ref 4.2–5.9)
TSH REFLEX FT4: 3.92 UIU/ML (ref 0.27–4.2)
WBC # BLD: 5.9 K/UL (ref 4–11)

## 2021-11-03 DIAGNOSIS — E78.2 MIXED HYPERLIPIDEMIA: ICD-10-CM

## 2021-11-03 LAB
A/G RATIO: 2.3 (ref 1.1–2.2)
ALBUMIN SERPL-MCNC: 4.9 G/DL (ref 3.4–5)
ALP BLD-CCNC: 66 U/L (ref 40–129)
ALT SERPL-CCNC: 17 U/L (ref 10–40)
ANION GAP SERPL CALCULATED.3IONS-SCNC: 16 MMOL/L (ref 3–16)
AST SERPL-CCNC: 19 U/L (ref 15–37)
BILIRUB SERPL-MCNC: 0.7 MG/DL (ref 0–1)
BUN BLDV-MCNC: 14 MG/DL (ref 7–20)
CALCIUM SERPL-MCNC: 9.8 MG/DL (ref 8.3–10.6)
CHLORIDE BLD-SCNC: 102 MMOL/L (ref 99–110)
CO2: 22 MMOL/L (ref 21–32)
CREAT SERPL-MCNC: 1 MG/DL (ref 0.8–1.3)
GFR AFRICAN AMERICAN: >60
GFR NON-AFRICAN AMERICAN: >60
GLUCOSE BLD-MCNC: 90 MG/DL (ref 70–99)
POTASSIUM SERPL-SCNC: 4.6 MMOL/L (ref 3.5–5.1)
SODIUM BLD-SCNC: 140 MMOL/L (ref 136–145)
TOTAL PROTEIN: 7 G/DL (ref 6.4–8.2)

## 2021-11-09 ENCOUNTER — HOSPITAL ENCOUNTER (OUTPATIENT)
Dept: NON INVASIVE DIAGNOSTICS | Age: 86
Discharge: HOME | End: 2021-11-09
Payer: MEDICARE

## 2021-11-09 DIAGNOSIS — R00.0 TACHYCARDIA: ICD-10-CM

## 2021-11-09 PROCEDURE — 93226 XTRNL ECG REC<48 HR SCAN A/R: CPT

## 2021-11-09 PROCEDURE — 93225 XTRNL ECG REC<48 HRS REC: CPT

## 2021-11-16 ENCOUNTER — TELEPHONE (OUTPATIENT)
Dept: CARDIOLOGY CLINIC | Age: 86
End: 2021-11-16

## 2021-11-16 LAB
ACQUISITION DURATION: NORMAL S
AVERAGE HEART RATE: 65 BPM
EKG DIAGNOSIS: NORMAL
HOLTER MAX HEART RATE: 101 BPM
HOOKUP DATE: NORMAL
HOOKUP TIME: NORMAL
MAX HEART RATE TIME/DATE: NORMAL
MIN HEART RATE TIME/DATE: NORMAL
MIN HEART RATE: 47 BPM
NUMBER OF QRS COMPLEXES: NORMAL
NUMBER OF SUPRAVENTRICULAR COUPLETS: 0
NUMBER OF SUPRAVENTRICULAR ECTOPICS: 2427
NUMBER OF SUPRAVENTRICULAR ISOLATED BEATS: 2130
NUMBER OF VENTRICULAR BIGEMINAL CYCLES: 1
NUMBER OF VENTRICULAR COUPLETS: 19
NUMBER OF VENTRICULAR ECTOPICS: 1960

## 2021-11-16 NOTE — TELEPHONE ENCOUNTER
TEST/LAB RESULTS      WHICH RESULTS ARE YOU CALLING ABOUT:  48 hour holter monitor    WHEN WAS THIS DONE:  Put on 11/9/2021 dropped off Friday Nov 12th    WHERE DID YOU HAVE THIS DONE:  Our office     CALL BACK NUMBER:  Dr. Chun Alex office  [phone]

## 2021-11-16 NOTE — TELEPHONE ENCOUNTER
Spoke with Ketan Peoples office. She states that it was just read by the physician and they will be forwarding the results to PCP soon. Called and spoke with Rabia Hall at Dr. Alyson Jansen office notified the monitor was placed at the hospital non at [facility] and they said the results were just read by the physician an they will be forwarding results soon. Verbalized understanding.

## 2022-04-01 ENCOUNTER — HOSPITAL ENCOUNTER (OUTPATIENT)
Dept: MRI IMAGING | Age: 87
Discharge: HOME | End: 2022-04-01
Payer: MEDICARE

## 2022-04-01 DIAGNOSIS — M54.16 LUMBAR RADICULOPATHY: ICD-10-CM

## 2022-04-01 PROCEDURE — 72148 MRI LUMBAR SPINE W/O DYE: CPT

## 2022-09-23 DIAGNOSIS — D61.818 PANCYTOPENIA (HCC): ICD-10-CM

## 2022-09-23 DIAGNOSIS — D64.9 ANEMIA, UNSPECIFIED TYPE: ICD-10-CM

## 2022-09-23 DIAGNOSIS — R79.9 ABNORMAL FINDING OF BLOOD CHEMISTRY, UNSPECIFIED: ICD-10-CM

## 2022-09-23 LAB
FERRITIN: 263.9 NG/ML (ref 30–400)
FOLATE: 15.95 NG/ML (ref 4.78–24.2)
HAPTOGLOBIN: 51 MG/DL (ref 30–200)
HCT VFR BLD CALC: 28.2 % (ref 40.5–52.5)
IMMATURE RETIC FRACT: 0.64 (ref 0.21–0.37)
IRON SATURATION: 89 % (ref 20–50)
IRON: 219 UG/DL (ref 59–158)
LACTATE DEHYDROGENASE: 304 U/L (ref 100–190)
RETICULOCYTE ABSOLUTE COUNT: 0.03 M/UL
RETICULOCYTE COUNT PCT: 1.06 % (ref 0.5–2.18)
TOTAL IRON BINDING CAPACITY: 245 UG/DL (ref 260–445)
VITAMIN B-12: 615 PG/ML (ref 211–911)

## 2022-11-01 ENCOUNTER — HOSPITAL ENCOUNTER (INPATIENT)
Age: 87
LOS: 1 days | Discharge: HOME | DRG: 641 | End: 2022-11-02
Attending: EMERGENCY MEDICINE | Admitting: STUDENT IN AN ORGANIZED HEALTH CARE EDUCATION/TRAINING PROGRAM
Payer: MEDICARE

## 2022-11-01 ENCOUNTER — APPOINTMENT (OUTPATIENT)
Dept: GENERAL RADIOLOGY | Age: 87
DRG: 641 | End: 2022-11-01
Payer: MEDICARE

## 2022-11-01 ENCOUNTER — APPOINTMENT (OUTPATIENT)
Dept: CT IMAGING | Age: 87
DRG: 641 | End: 2022-11-01
Payer: MEDICARE

## 2022-11-01 DIAGNOSIS — R55 NEAR SYNCOPE: ICD-10-CM

## 2022-11-01 DIAGNOSIS — R42 LIGHTHEADEDNESS: Primary | ICD-10-CM

## 2022-11-01 DIAGNOSIS — D61.818 PANCYTOPENIA (HCC): ICD-10-CM

## 2022-11-01 LAB
A/G RATIO: 1.5 (ref 1.1–2.2)
ALBUMIN SERPL-MCNC: 3.8 G/DL (ref 3.4–5)
ALP BLD-CCNC: 67 U/L (ref 40–129)
ALT SERPL-CCNC: 25 U/L (ref 10–40)
ANION GAP SERPL CALCULATED.3IONS-SCNC: 12 MMOL/L (ref 3–16)
ANISOCYTOSIS: ABNORMAL
AST SERPL-CCNC: 23 U/L (ref 15–37)
BASOPHILS ABSOLUTE: 0 K/UL (ref 0–0.2)
BASOPHILS RELATIVE PERCENT: 1.5 %
BILIRUB SERPL-MCNC: 0.7 MG/DL (ref 0–1)
BILIRUBIN URINE: NEGATIVE
BLOOD, URINE: NEGATIVE
BUN BLDV-MCNC: 12 MG/DL (ref 7–20)
CALCIUM SERPL-MCNC: 8.9 MG/DL (ref 8.3–10.6)
CHLORIDE BLD-SCNC: 100 MMOL/L (ref 99–110)
CLARITY: CLEAR
CO2: 19 MMOL/L (ref 21–32)
COLOR: YELLOW
CREAT SERPL-MCNC: 0.9 MG/DL (ref 0.8–1.3)
EKG ATRIAL RATE: 94 BPM
EKG DIAGNOSIS: NORMAL
EKG P AXIS: 46 DEGREES
EKG P-R INTERVAL: 146 MS
EKG Q-T INTERVAL: 376 MS
EKG QRS DURATION: 74 MS
EKG QTC CALCULATION (BAZETT): 470 MS
EKG R AXIS: 36 DEGREES
EKG T AXIS: 39 DEGREES
EKG VENTRICULAR RATE: 94 BPM
EOSINOPHILS ABSOLUTE: 0 K/UL (ref 0–0.6)
EOSINOPHILS RELATIVE PERCENT: 0.5 %
GFR SERPL CREATININE-BSD FRML MDRD: >60 ML/MIN/{1.73_M2}
GLUCOSE BLD-MCNC: 114 MG/DL (ref 70–99)
GLUCOSE BLD-MCNC: 130 MG/DL (ref 70–99)
GLUCOSE URINE: NEGATIVE MG/DL
HCT VFR BLD CALC: 23.1 % (ref 40.5–52.5)
HEMATOLOGY PATH CONSULT: YES
HEMOGLOBIN: 7.6 G/DL (ref 13.5–17.5)
KETONES, URINE: NEGATIVE MG/DL
LEUKOCYTE ESTERASE, URINE: NEGATIVE
LYMPHOCYTES ABSOLUTE: 0.7 K/UL (ref 1–5.1)
LYMPHOCYTES RELATIVE PERCENT: 67.2 %
MACROCYTES: ABNORMAL
MCH RBC QN AUTO: 37.5 PG (ref 26–34)
MCHC RBC AUTO-ENTMCNC: 33.2 G/DL (ref 31–36)
MCV RBC AUTO: 113 FL (ref 80–100)
MICROSCOPIC EXAMINATION: ABNORMAL
MONOCYTES ABSOLUTE: 0 K/UL (ref 0–1.3)
MONOCYTES RELATIVE PERCENT: 3.6 %
NEUTROPHILS ABSOLUTE: 0.3 K/UL (ref 1.7–7.7)
NEUTROPHILS RELATIVE PERCENT: 27.2 %
NITRITE, URINE: NEGATIVE
OVALOCYTES: ABNORMAL
PDW BLD-RTO: 19.4 % (ref 12.4–15.4)
PERFORMED ON: ABNORMAL
PH UA: 8.5 (ref 5–8)
PLATELET # BLD: 59 K/UL (ref 135–450)
PLATELET SLIDE REVIEW: ABNORMAL
PMV BLD AUTO: 9.6 FL (ref 5–10.5)
POIKILOCYTES: ABNORMAL
POTASSIUM REFLEX MAGNESIUM: 4.3 MMOL/L (ref 3.5–5.1)
PROTEIN UA: NEGATIVE MG/DL
RAPID INFLUENZA  B AGN: NEGATIVE
RAPID INFLUENZA A AGN: NEGATIVE
RBC # BLD: 2.04 M/UL (ref 4.2–5.9)
SARS-COV-2, NAAT: NOT DETECTED
SODIUM BLD-SCNC: 131 MMOL/L (ref 136–145)
SPECIFIC GRAVITY UA: 1.01 (ref 1–1.03)
TOTAL PROTEIN: 6.4 G/DL (ref 6.4–8.2)
TROPONIN: <0.01 NG/ML
URINE REFLEX TO CULTURE: ABNORMAL
URINE TYPE: ABNORMAL
UROBILINOGEN, URINE: 0.2 E.U./DL
WBC # BLD: 1.1 K/UL (ref 4–11)

## 2022-11-01 PROCEDURE — 2580000003 HC RX 258: Performed by: STUDENT IN AN ORGANIZED HEALTH CARE EDUCATION/TRAINING PROGRAM

## 2022-11-01 PROCEDURE — 70450 CT HEAD/BRAIN W/O DYE: CPT

## 2022-11-01 PROCEDURE — 85025 COMPLETE CBC W/AUTO DIFF WBC: CPT

## 2022-11-01 PROCEDURE — 87635 SARS-COV-2 COVID-19 AMP PRB: CPT

## 2022-11-01 PROCEDURE — 80053 COMPREHEN METABOLIC PANEL: CPT

## 2022-11-01 PROCEDURE — 2580000003 HC RX 258: Performed by: EMERGENCY MEDICINE

## 2022-11-01 PROCEDURE — 1200000000 HC SEMI PRIVATE

## 2022-11-01 PROCEDURE — 84484 ASSAY OF TROPONIN QUANT: CPT

## 2022-11-01 PROCEDURE — 6370000000 HC RX 637 (ALT 250 FOR IP): Performed by: STUDENT IN AN ORGANIZED HEALTH CARE EDUCATION/TRAINING PROGRAM

## 2022-11-01 PROCEDURE — 99285 EMERGENCY DEPT VISIT HI MDM: CPT

## 2022-11-01 PROCEDURE — 71045 X-RAY EXAM CHEST 1 VIEW: CPT

## 2022-11-01 PROCEDURE — 81003 URINALYSIS AUTO W/O SCOPE: CPT

## 2022-11-01 PROCEDURE — 93005 ELECTROCARDIOGRAM TRACING: CPT | Performed by: EMERGENCY MEDICINE

## 2022-11-01 PROCEDURE — 93010 ELECTROCARDIOGRAM REPORT: CPT | Performed by: INTERNAL MEDICINE

## 2022-11-01 PROCEDURE — 87804 INFLUENZA ASSAY W/OPTIC: CPT

## 2022-11-01 RX ORDER — TAMSULOSIN HYDROCHLORIDE 0.4 MG/1
0.4 CAPSULE ORAL DAILY
Status: DISCONTINUED | OUTPATIENT
Start: 2022-11-02 | End: 2022-11-01 | Stop reason: ALTCHOICE

## 2022-11-01 RX ORDER — PANTOPRAZOLE SODIUM 40 MG/1
40 TABLET, DELAYED RELEASE ORAL
Status: DISCONTINUED | OUTPATIENT
Start: 2022-11-01 | End: 2022-11-02 | Stop reason: HOSPADM

## 2022-11-01 RX ORDER — TAMSULOSIN HYDROCHLORIDE 0.4 MG/1
0.4 CAPSULE ORAL DAILY
Status: DISCONTINUED | OUTPATIENT
Start: 2022-11-01 | End: 2022-11-02 | Stop reason: HOSPADM

## 2022-11-01 RX ORDER — 0.9 % SODIUM CHLORIDE 0.9 %
500 INTRAVENOUS SOLUTION INTRAVENOUS ONCE
Status: COMPLETED | OUTPATIENT
Start: 2022-11-01 | End: 2022-11-01

## 2022-11-01 RX ORDER — SODIUM CHLORIDE 0.9 % (FLUSH) 0.9 %
5-40 SYRINGE (ML) INJECTION PRN
Status: DISCONTINUED | OUTPATIENT
Start: 2022-11-01 | End: 2022-11-02 | Stop reason: HOSPADM

## 2022-11-01 RX ORDER — ONDANSETRON 2 MG/ML
4 INJECTION INTRAMUSCULAR; INTRAVENOUS EVERY 6 HOURS PRN
Status: DISCONTINUED | OUTPATIENT
Start: 2022-11-01 | End: 2022-11-02 | Stop reason: HOSPADM

## 2022-11-01 RX ORDER — SODIUM CHLORIDE 9 MG/ML
INJECTION, SOLUTION INTRAVENOUS PRN
Status: DISCONTINUED | OUTPATIENT
Start: 2022-11-01 | End: 2022-11-02 | Stop reason: HOSPADM

## 2022-11-01 RX ORDER — ROSUVASTATIN CALCIUM 10 MG/1
10 TABLET, COATED ORAL NIGHTLY
Status: DISCONTINUED | OUTPATIENT
Start: 2022-11-01 | End: 2022-11-02 | Stop reason: HOSPADM

## 2022-11-01 RX ORDER — ONDANSETRON 4 MG/1
4 TABLET, ORALLY DISINTEGRATING ORAL EVERY 8 HOURS PRN
Status: DISCONTINUED | OUTPATIENT
Start: 2022-11-01 | End: 2022-11-02 | Stop reason: HOSPADM

## 2022-11-01 RX ORDER — PANTOPRAZOLE SODIUM 40 MG/1
40 TABLET, DELAYED RELEASE ORAL
Status: DISCONTINUED | OUTPATIENT
Start: 2022-11-02 | End: 2022-11-01 | Stop reason: ALTCHOICE

## 2022-11-01 RX ORDER — HYDROCODONE BITARTRATE AND ACETAMINOPHEN 5; 325 MG/1; MG/1
1 TABLET ORAL 2 TIMES DAILY PRN
COMMUNITY
End: 2022-11-18 | Stop reason: SDUPTHER

## 2022-11-01 RX ORDER — SODIUM CHLORIDE, SODIUM LACTATE, POTASSIUM CHLORIDE, CALCIUM CHLORIDE 600; 310; 30; 20 MG/100ML; MG/100ML; MG/100ML; MG/100ML
INJECTION, SOLUTION INTRAVENOUS CONTINUOUS
Status: DISCONTINUED | OUTPATIENT
Start: 2022-11-01 | End: 2022-11-02

## 2022-11-01 RX ORDER — LORAZEPAM 0.5 MG/1
0.5 TABLET ORAL NIGHTLY PRN
COMMUNITY

## 2022-11-01 RX ORDER — ACETAMINOPHEN 325 MG/1
650 TABLET ORAL EVERY 6 HOURS PRN
Status: DISCONTINUED | OUTPATIENT
Start: 2022-11-01 | End: 2022-11-02 | Stop reason: HOSPADM

## 2022-11-01 RX ORDER — FLUTICASONE PROPIONATE 50 MCG
2 SPRAY, SUSPENSION (ML) NASAL DAILY
Status: DISCONTINUED | OUTPATIENT
Start: 2022-11-02 | End: 2022-11-02 | Stop reason: HOSPADM

## 2022-11-01 RX ORDER — ACETAMINOPHEN 650 MG/1
650 SUPPOSITORY RECTAL EVERY 6 HOURS PRN
Status: DISCONTINUED | OUTPATIENT
Start: 2022-11-01 | End: 2022-11-02 | Stop reason: HOSPADM

## 2022-11-01 RX ORDER — SODIUM CHLORIDE 0.9 % (FLUSH) 0.9 %
5-40 SYRINGE (ML) INJECTION EVERY 12 HOURS SCHEDULED
Status: DISCONTINUED | OUTPATIENT
Start: 2022-11-01 | End: 2022-11-02 | Stop reason: HOSPADM

## 2022-11-01 RX ORDER — POLYETHYLENE GLYCOL 3350 17 G/17G
17 POWDER, FOR SOLUTION ORAL DAILY PRN
Status: DISCONTINUED | OUTPATIENT
Start: 2022-11-01 | End: 2022-11-02 | Stop reason: HOSPADM

## 2022-11-01 RX ADMIN — TAMSULOSIN HYDROCHLORIDE 0.4 MG: 0.4 CAPSULE ORAL at 15:46

## 2022-11-01 RX ADMIN — PANTOPRAZOLE SODIUM 40 MG: 40 TABLET, DELAYED RELEASE ORAL at 15:46

## 2022-11-01 RX ADMIN — SODIUM CHLORIDE, POTASSIUM CHLORIDE, SODIUM LACTATE AND CALCIUM CHLORIDE: 600; 310; 30; 20 INJECTION, SOLUTION INTRAVENOUS at 23:24

## 2022-11-01 RX ADMIN — ROSUVASTATIN CALCIUM 10 MG: 10 TABLET, FILM COATED ORAL at 20:03

## 2022-11-01 RX ADMIN — SODIUM CHLORIDE 500 ML: 9 INJECTION, SOLUTION INTRAVENOUS at 09:19

## 2022-11-01 RX ADMIN — Medication 10 ML: at 20:04

## 2022-11-01 ASSESSMENT — ENCOUNTER SYMPTOMS
NAUSEA: 0
COLOR CHANGE: 0
SORE THROAT: 0
COUGH: 0
WHEEZING: 0
EYES NEGATIVE: 1
ABDOMINAL PAIN: 0
SHORTNESS OF BREATH: 0
VOMITING: 0
DIARRHEA: 0
RHINORRHEA: 0
CHEST TIGHTNESS: 0

## 2022-11-01 ASSESSMENT — PAIN - FUNCTIONAL ASSESSMENT: PAIN_FUNCTIONAL_ASSESSMENT: NONE - DENIES PAIN

## 2022-11-01 NOTE — PLAN OF CARE
Problem: Discharge Planning  Goal: Discharge to home or other facility with appropriate resources  Outcome: Progressing  Flowsheets (Taken 11/1/2022 2332)  Discharge to home or other facility with appropriate resources:   Identify barriers to discharge with patient and caregiver   Arrange for needed discharge resources and transportation as appropriate     Problem: Safety - Adult  Goal: Free from fall injury  Outcome: Progressing     Problem: ABCDS Injury Assessment  Goal: Absence of physical injury  Outcome: Progressing

## 2022-11-01 NOTE — PROGRESS NOTES
Medication Reconciliation    List of medications patient is currently taking is complete. Source of information: 1. Conversation with patient at bedside                                      2. EPIC records                                       3. Patient provided medication list from wife     Allergies  Cephalosporins and Cefadroxil     Notes regarding home medications:   1. Patient did not take any of his home medications prior to arrival to the emergency department today.     Rinda Riedel, [address] [place] [address], PharmD, BCPS  11/1/2022 12:21 PM

## 2022-11-01 NOTE — PROGRESS NOTES
4 Eyes Skin Assessment     NAME:  John Xie  YOB: 1933  MEDICAL RECORD NUMBER:  [id number]    The patient is being assess for  Admission    I agree that 2 RN's have performed a thorough Head to Toe Skin Assessment on the patient. ALL assessment sites listed below have been assessed. Areas assessed by both nurses:    Head, Face, Ears, Shoulders, Back, Chest, Arms, Elbows, Hands, Sacrum. Buttock, Coccyx, Ischium, and Legs. Feet and Heels        Does the Patient have a Wound?  No noted wound(s) Scattered bruising noted        Harry Prevention initiated:  No   Wound Care Orders initiated:  NA    Pressure Injury (Stage 3,4, Unstageable, DTI, NWPT, and Complex wounds) if present place referral/consult order under [de-identified] NA    New and Established Ostomies if present place consult order under : NA      Nurse 1 eSignature: Electronically signed by Leopoldo Melody, RN on 11/1/22 at 4:53 PM EDT    **SHARE this note so that the co-signing nurse is able to place an eSignature**    Nurse 2 eSignature: Electronically signed by Hugo Barth RN on 11/1/22 at 6:29 PM EDT

## 2022-11-01 NOTE — PROGRESS NOTES
Patient admitted to room 4130 from ED. Oriented to room, call light, and floor policies. Plan of care reviewed with patient/family. Pt is resting in bed and denies pain at this time; no s/s of distress noted. Assessment completed; VSS. Tele in place reading Sinus rhythm with a rate of 91. Safety precautions in place; call light and bedside table within reach. Pt encouraged to call for needs or ambulation. Pt VU. Will continue to monitor.

## 2022-11-01 NOTE — ED PROVIDER NOTES
[address] [facility]      Pt Name: Jim Allen  MRN: [id number]  [place] 11/12/1933  Date ofevaluation: 11/1/2022  Provider: Yoselin Braun MD    CHIEF COMPLAINT       Chief Complaint   Patient presents with    Dizziness     No feeling well, started yesterday. Pt states he was dizzy in the shower and almost fell but was able to catch himself.          HISTORY OF PRESENT ILLNESS   (Location/Symptom, Timing/Onset,Context/Setting, Quality, Duration, Modifying Factors, Severity)  Note limiting factors. Jim Allen is a 80 y.o. male  who  has a past medical history of Collapsed lung, Hyperlipidemia, Hypertension, Leg fracture, right, Post-herpetic polyneuropathy, and Prostate cancer ([facility] [address] who presents to the emergency department    70-year-old male who presents with lightheadedness that started yesterday gradual onset constant and worsening. Patient states that it is better when he lies down or sits down. Was in the shower this morning and felt like he was going to fall down or pass out but he did not. Blood sugars been stable. Risk factors it patient has a history of pancytopenia. Also has a history of hypertension. Denies any abdominal pain chest pain shortness of breath headache vision changes. As patient is laying still currently has no symptoms. No numbness or weakness. Patient states that he is been eating and drinking normally. No other modifying factors besides as listed no other associated symptoms besides lightheadedness. See review of systems below for further details. Mild to moderate in nature. The history is provided by the patient. No  was used. NursingNotes were reviewed. REVIEW OF SYSTEMS    (2-9 systems for level 4, 10 or more for level 5)     Review of Systems   Constitutional:  Positive for fatigue. Negative for fever.    HENT:  Negative for congestion, rhinorrhea and sore throat. Eyes: Negative. Respiratory:  Negative for cough, chest tightness, shortness of breath and wheezing. Cardiovascular:  Negative for chest pain. Gastrointestinal:  Negative for abdominal pain, diarrhea, nausea and vomiting. Endocrine: Negative. Genitourinary: Negative. Musculoskeletal: Negative. Skin:  Negative for color change and rash. Allergic/Immunologic: Negative for environmental allergies and immunocompromised state. Neurological:  Positive for light-headedness. Negative for dizziness, weakness, numbness and headaches. Hematological: Negative. All other systems reviewed and are negative. Except as noted above the remainder of the review of systems was reviewed and negative. PAST MEDICAL HISTORY     Past Medical History:   Diagnosis Date    Collapsed lung     H/O    Hyperlipidemia     Hypertension     Leg fracture, right     H/O    Post-herpetic polyneuropathy     Prostate cancer ([facility] [address]          SURGICALHISTORY       Past Surgical History:   Procedure Laterality Date    APPENDECTOMY      COLONOSCOPY  11/03/2020    COLONOSCOPY N/A 11/3/2020    COLONOSCOPY with ABLATION performed by Getachew Betts MD at [address] [place] [address]  11/3/2020    COLONOSCOPY WITH BIOPSY performed by Getachew Betts MD at [address] [place] [address] 5/25/2021    COLONOSCOPY POLYPECTOMY SNARE/COLD BIOPSY performed by Getachew Betts MD at [address] [place] [address]       Previous Medications    ASPIRIN 81 MG EC TABLET    Take 81 mg by mouth daily    FLUTICASONE (FLONASE) 50 MCG/ACT NASAL SPRAY    2 sprays by Each Nostril route daily    METOPROLOL SUCCINATE (TOPROL XL) 25 MG EXTENDED RELEASE TABLET    Take 1 tablet by mouth daily    OMEPRAZOLE (PRILOSEC) 20 MG CAPSULE    Take 20 mg by mouth daily as needed.  3 times a week    POLYETHYLENE GLYCOL (GLYCOLAX) PACKET    Take 17 g by mouth    ROSUVASTATIN (CRESTOR) 10 MG TABLET    TAKE ONE TABLET BY MOUTH EVERY NIGHT AT BEDTIME    TAMSULOSIN (FLOMAX) 0.4 MG CAPSULE    Take 1 capsule by mouth daily            Cephalosporins and Cefadroxil    FAMILY HISTORY       Family History   Problem Relation Age of Onset    No Known Problems Mother     No Known Problems Father           SOCIAL HISTORY       Social History     Socioeconomic History    Marital status:      Spouse name: None    Number of children: None    Years of education: None    Highest education level: None   Tobacco Use    Smoking status: Former    Smokeless tobacco: Never   Vaping Use    Vaping Use: Never used   Substance and Sexual Activity    Alcohol use: No    Drug use: No    Sexual activity: Not Currently     Social Determinants of Health     Financial Resource Strain: Low Risk     Difficulty of Paying Living Expenses: Not hard at all   Food Insecurity: No Food Insecurity    Worried About [employer] in the Last Year: Never true    Ran Out of Food in the Last Year: Never true   Physical Activity: Insufficiently Active    Days of Exercise per Week: 3 days    Minutes of Exercise per Session: 20 min       SCREENINGS    Tad Coma Scale  Eye Opening: Spontaneous  Best Verbal Response: Oriented  Best Motor Response: Obeys commands  Tad Coma Scale Score: 15        PHYSICAL EXAM    (up to 7 for level 4, 8 or more for level 5)     ED Triage Vitals   BP Temp Temp Source Heart Rate Resp SpO2 Height Weight   11/01/22 0815 11/01/22 0803 11/01/22 0803 11/01/22 0803 11/01/22 0803 11/01/22 0803 11/01/22 0803 11/01/22 0803   (!) 152/75 97.7 °F (36.5 °C) Oral 93 16 100 % 6' 1\" (1.854 m) 164 lb 14.5 oz (74.8 kg)       Physical Exam  Vitals and nursing note reviewed. Constitutional:       General: He is not in acute distress. Appearance: Normal appearance. He is well-developed and normal weight. He is not ill-appearing, toxic-appearing or diaphoretic. HENT:      Head: Normocephalic and atraumatic.       Right Ear: Tympanic membrane and external ear normal.      Left Ear: Tympanic membrane and external ear normal.      Nose: Nose normal. No congestion or rhinorrhea. Mouth/Throat:      Mouth: Mucous membranes are moist.      Pharynx: Oropharynx is clear. No oropharyngeal exudate or posterior oropharyngeal erythema. Eyes:      Extraocular Movements: Extraocular movements intact. Pupils: Pupils are equal, round, and reactive to light. Comments: Pale conjunctiva   Cardiovascular:      Rate and Rhythm: Normal rate and regular rhythm. Pulses: Normal pulses. Heart sounds: Normal heart sounds. Pulmonary:      Effort: Pulmonary effort is normal. No respiratory distress. Breath sounds: Normal breath sounds. No stridor. No decreased breath sounds, wheezing, rhonchi or rales. Chest:      Chest wall: No tenderness. Abdominal:      General: Bowel sounds are normal. There is no distension. Palpations: Abdomen is soft. Tenderness: There is no abdominal tenderness. There is no guarding or rebound. Musculoskeletal:         General: Normal range of motion. Cervical back: Normal range of motion and neck supple. No rigidity. Right lower leg: No edema. Left lower leg: No edema. Lymphadenopathy:      Cervical: No cervical adenopathy. Skin:     General: Skin is warm and dry. Capillary Refill: Capillary refill takes less than 2 seconds. Coloration: Skin is pale. Findings: No rash. Neurological:      General: No focal deficit present. Mental Status: He is alert and oriented to person, place, and time. GCS: GCS eye subscore is 4. GCS verbal subscore is 5. GCS motor subscore is 6. Cranial Nerves: No cranial nerve deficit, dysarthria or facial asymmetry. Sensory: Sensation is intact. No sensory deficit. Motor: Motor function is intact. No weakness, tremor, atrophy, abnormal muscle tone, seizure activity or pronator drift. Coordination: Coordination is intact.  Coordination normal. Finger-Nose-Finger Test and Heel to [facility] Test normal.   Psychiatric:         Mood and Affect: Mood normal.         Behavior: Behavior normal.       RESULTS     EKG: All EKG's are interpreted by the Emergency Department Physician who either signs or Co-signs this chart in the absence of a cardiologist.    EKG Interpretation    Interpreted by emergency department physician    Rhythm: normal sinus   Rate: normal  Axis: normal  Ectopy: none  Conduction: normal  ST Segments: normal  T Waves: normal  Q Waves: none    Clinical Impression: Normal sinus rhythm, no significant T wave or ST changes. Normal W[place] interval, normal QRS duration, normal QT QTC. Normal axis. No arrhythmia or signs of ischemia. Otherwise normal EKG. Interpreted by myself. RADIOLOGY:   Non-plain filmimages such as CT, Ultrasound and MRI are read by the radiologist.  All images reviewed by the emergency department physician who either signs or cosigns this chart. Interpretation per the Radiologist below, if available at the time ofthis note:    CT Head W/O Contrast   Preliminary Result   No evidence of acute intracranial abnormality. XR CHEST PORTABLE   Final Result   No acute cardiopulmonary disease.                ED BEDSIDE ULTRASOUND:   Performed by ED Physician - none    LABS:  Labs Reviewed   CBC WITH AUTO DIFFERENTIAL - Abnormal; Notable for the following components:       Result Value    WBC 1.1 (*)     RBC 2.04 (*)     Hemoglobin 7.6 (*)     Hematocrit 23.1 (*)     .0 (*)     MCH 37.5 (*)     RDW 19.4 (*)     Platelets 59 (*)     Neutrophils Absolute 0.3 (*)     Lymphocytes Absolute 0.7 (*)     Anisocytosis 1+ (*)     Macrocytes 2+ (*)     Poikilocytes Occasional (*)     Ovalocytes Occasional (*)     All other components within normal limits    Narrative:     Melissa Garcia tel. [id number],  Hematology results called to and read back by yissel jackson rn., 11/01/2022 09:44,  by [facility]  Hematology results called to and read back by Christian Govea RN, 11/01/2022 08:51,  by [facility]   COMPREHENSIVE METABOLIC PANEL W/ REFLEX TO MG FOR LOW K - Abnormal; Notable for the following components:    Sodium 131 (*)     CO2 19 (*)     Glucose 114 (*)     All other components within normal limits   URINALYSIS WITH REFLEX TO CULTURE - Abnormal; Notable for the following components:    pH, UA 8.5 (*)     All other components within normal limits   POCT GLUCOSE - Abnormal; Notable for the following components:    POC Glucose 130 (*)     All other components within normal limits   COVID-19, RAPID   RAPID INFLUENZA A/B ANTIGENS   TROPONIN       All other labs were within normal range or not returned as of this dictation.     EMERGENCY DEPARTMENT COURSE and DIFFERENTIAL DIAGNOSIS/MDM:   Vitals:    Vitals:    11/01/22 0803 11/01/22 0815 11/01/22 0830 11/01/22 0845   BP:  (!) 152/75 (!) 124/55 139/66   Pulse: 93 93 86 88   Resp: 16 22 20 18   Temp: 97.7 °F (36.5 °C)      TempSrc: Oral      SpO2: 100%  100% 90%   Weight: 164 lb 14.5 oz (74.8 kg)      Height: 6' 1\" (1.854 m)          Patient was given thefollowing medications:  Medications   sodium chloride flush 0.9 % injection 5-40 mL (has no administration in time range)   sodium chloride flush 0.9 % injection 5-40 mL (has no administration in time range)   0.9 % sodium chloride infusion (has no administration in time range)   ondansetron (ZOFRAN-ODT) disintegrating tablet 4 mg (has no administration in time range)     Or   ondansetron (ZOFRAN) injection 4 mg (has no administration in time range)   polyethylene glycol (GLYCOLAX) packet 17 g (has no administration in time range)   acetaminophen (TYLENOL) tablet 650 mg (has no administration in time range)     Or   acetaminophen (TYLENOL) suppository 650 mg (has no administration in time range)   fluticasone (FLONASE) 50 MCG/ACT nasal spray 2 spray (has no administration in time range)   rosuvastatin (CRESTOR) tablet 10 mg (has no administration in time range)   tamsulosin (FLOMAX) capsule 0.4 mg (has no administration in time range)   pantoprazole (PROTONIX) tablet 40 mg (has no administration in time range)   0.9 % sodium chloride bolus (500 mLs IntraVENous New Bag 11/1/22 0919)       ED COURSE & MEDICAL DECISION MAKING    Pertinent Labs & Imaging studies reviewed. (See chart for details)   -  Patient seen and evaluated in the emergency department. -  Triage and nursing notes reviewed and incorporated. -  Old chart records reviewed and incorporated. -  Differential diagnosis includes: Differential diagnosis: Ménière's disease, benign positional vertigo, stroke or TIA in the posterior circulation, bleed of the cerebellopontine angle, cardiac arrhythmia, anemia, dehydration, sepsis, Metabolic emergency, Multiple Sclerosis, brain or ENT tumor, other    80-year-old male who presents for lightheadedness. No symptoms at rest patient appears pale. Rest of exam relatively unremarkable patient is afebrile not tachycardic saturating between 90 and 94% on room air EKG without signs of ischemia. Neuro exam is unremarkable. Patient denies any blood in his stool or dark stools. Will obtain broad laboratory work-up including CBC, CMP, troponin as well as chest x-ray and CT head. Will reeval closely and disposition accordingly. Will give patient IV fluids patient may have dehydration and/or worsening anemia and/or metabolic abnormality. No indication for further work-up of stroke or TIA at this time. No signs of trauma. No syncope. -  Work-up included:  See above  -  ED treatment included: See above  -  Results discussed with patient. The patient is agreeable with plan of care and disposition. Is this patient to be included in the SEP-1 Core Measure due to severe sepsis or septic shock? No   Exclusion criteria - the patient is NOT to be included for SEP-1 Core Measure due to:   Alternative explanation for abnormal labs/vitals that do not relate to sepsis, see MDM for further explanation     REASSESSMENT     ED Course as of 11/01/22 1140   Tue Nov 01, 2022   0905 Patient with significant pancytopenia similar to previous but slightly worsening white blood cell count 1.1 hemoglobin 7.6. [SC]   1102 Urine does not appear infected COVID and flu are negative rest of vitals remained stable. Will consult patient's primary care provider for possible observation admission. [SC]   [phone] Spoke with Dr. Katrina Zuluaga as well as message Dr. Oliver Precise patient's hematologist patient be admitted for observation admission at this time. Patient is feeling better after IV fluids. [SC]      ED Course User Index  [SC] Fuentes Philip MD         CRITICAL CARE TIME   Total Critical Care time was 30 minutes, excluding separately reportable procedures. There was a high probability of clinically significant/life threatening deterioration in the patient's condition which required my urgent intervention. This includes multiple reevaluations, vital sign monitoring, pulse oximetry monitoring, telemetry monitoring, clinical response to the IV medications, reviewing the nursing notes, consultation time, dictation/documentation time, and interpretation of the labwork. (This time excludes time spent performing procedures). CONSULTS:  IP CONSULT TO PRIMARY CARE PROVIDER    PROCEDURES:  Unless otherwise noted below, none     Procedures    FINAL IMPRESSION      1. Lightheadedness    2. Pancytopenia ([address]    3. Near syncope          DISPOSITION/PLAN   DISPOSITION Decision To Admit 11/01/2022 11:39:56 AM      PATIENT REFERREDTO:  No follow-up provider specified.     DISCHARGEMEDICATIONS:  New Prescriptions    No medications on file          (Please note that portions of this note were completed with a voice recognition program.  Efforts were made to edit the dictations but occasionally words are mis-transcribed.)    Fuentes Philip MD (electronically signed)  Attending Emergency Physician Lyndsey Garcia MD  11/01/22 [id number]

## 2022-11-02 VITALS
SYSTOLIC BLOOD PRESSURE: 150 MMHG | RESPIRATION RATE: 18 BRPM | DIASTOLIC BLOOD PRESSURE: 77 MMHG | HEIGHT: 73 IN | BODY MASS INDEX: 21.1 KG/M2 | TEMPERATURE: 97.9 F | HEART RATE: 98 BPM | WEIGHT: 159.17 LBS | OXYGEN SATURATION: 100 %

## 2022-11-02 LAB
A/G RATIO: 1.9 (ref 1.1–2.2)
ALBUMIN SERPL-MCNC: 3.9 G/DL (ref 3.4–5)
ALP BLD-CCNC: 71 U/L (ref 40–129)
ALT SERPL-CCNC: 21 U/L (ref 10–40)
ANION GAP SERPL CALCULATED.3IONS-SCNC: 14 MMOL/L (ref 3–16)
ANISOCYTOSIS: ABNORMAL
AST SERPL-CCNC: 17 U/L (ref 15–37)
BASOPHILS ABSOLUTE: 0 K/UL (ref 0–0.2)
BASOPHILS RELATIVE PERCENT: 1 %
BILIRUB SERPL-MCNC: 0.8 MG/DL (ref 0–1)
BUN BLDV-MCNC: 9 MG/DL (ref 7–20)
CALCIUM SERPL-MCNC: 8.9 MG/DL (ref 8.3–10.6)
CHLORIDE BLD-SCNC: 105 MMOL/L (ref 99–110)
CO2: 20 MMOL/L (ref 21–32)
CREAT SERPL-MCNC: 0.9 MG/DL (ref 0.8–1.3)
EOSINOPHILS ABSOLUTE: 0 K/UL (ref 0–0.6)
EOSINOPHILS RELATIVE PERCENT: 1 %
GFR SERPL CREATININE-BSD FRML MDRD: >60 ML/MIN/{1.73_M2}
GLUCOSE BLD-MCNC: 110 MG/DL (ref 70–99)
HCT VFR BLD CALC: 25.2 % (ref 40.5–52.5)
HEMATOLOGY PATH CONSULT: NO
HEMATOLOGY PATH CONSULT: NORMAL
HEMOGLOBIN: 8.4 G/DL (ref 13.5–17.5)
LV EF: 63 %
LVEF MODALITY: NORMAL
LYMPHOCYTES ABSOLUTE: 1 K/UL (ref 1–5.1)
LYMPHOCYTES RELATIVE PERCENT: 69 %
MACROCYTES: ABNORMAL
MCH RBC QN AUTO: 37.6 PG (ref 26–34)
MCHC RBC AUTO-ENTMCNC: 33.2 G/DL (ref 31–36)
MCV RBC AUTO: 113.2 FL (ref 80–100)
MONOCYTES ABSOLUTE: 0 K/UL (ref 0–1.3)
MONOCYTES RELATIVE PERCENT: 1 %
NEUTROPHILS ABSOLUTE: 0.4 K/UL (ref 1.7–7.7)
NEUTROPHILS RELATIVE PERCENT: 28 %
OVALOCYTES: ABNORMAL
PDW BLD-RTO: 19.4 % (ref 12.4–15.4)
PLATELET # BLD: 56 K/UL (ref 135–450)
PMV BLD AUTO: 9.2 FL (ref 5–10.5)
POIKILOCYTES: ABNORMAL
POTASSIUM REFLEX MAGNESIUM: 4.3 MMOL/L (ref 3.5–5.1)
RBC # BLD: 2.22 M/UL (ref 4.2–5.9)
SODIUM BLD-SCNC: 139 MMOL/L (ref 136–145)
TOTAL PROTEIN: 6 G/DL (ref 6.4–8.2)
WBC # BLD: 1.5 K/UL (ref 4–11)

## 2022-11-02 PROCEDURE — 93306 TTE W/DOPPLER COMPLETE: CPT

## 2022-11-02 PROCEDURE — 36415 COLL VENOUS BLD VENIPUNCTURE: CPT

## 2022-11-02 PROCEDURE — 97166 OT EVAL MOD COMPLEX 45 MIN: CPT

## 2022-11-02 PROCEDURE — 97530 THERAPEUTIC ACTIVITIES: CPT

## 2022-11-02 PROCEDURE — 97535 SELF CARE MNGMENT TRAINING: CPT

## 2022-11-02 PROCEDURE — 6370000000 HC RX 637 (ALT 250 FOR IP): Performed by: STUDENT IN AN ORGANIZED HEALTH CARE EDUCATION/TRAINING PROGRAM

## 2022-11-02 PROCEDURE — 80053 COMPREHEN METABOLIC PANEL: CPT

## 2022-11-02 PROCEDURE — 85025 COMPLETE CBC W/AUTO DIFF WBC: CPT

## 2022-11-02 PROCEDURE — 94760 N-INVAS EAR/PLS OXIMETRY 1: CPT

## 2022-11-02 PROCEDURE — 97162 PT EVAL MOD COMPLEX 30 MIN: CPT

## 2022-11-02 PROCEDURE — 2580000003 HC RX 258: Performed by: STUDENT IN AN ORGANIZED HEALTH CARE EDUCATION/TRAINING PROGRAM

## 2022-11-02 PROCEDURE — 99222 1ST HOSP IP/OBS MODERATE 55: CPT | Performed by: STUDENT IN AN ORGANIZED HEALTH CARE EDUCATION/TRAINING PROGRAM

## 2022-11-02 PROCEDURE — 97116 GAIT TRAINING THERAPY: CPT

## 2022-11-02 RX ADMIN — Medication 10 ML: at 09:40

## 2022-11-02 RX ADMIN — SODIUM CHLORIDE, POTASSIUM CHLORIDE, SODIUM LACTATE AND CALCIUM CHLORIDE: 600; 310; 30; 20 INJECTION, SOLUTION INTRAVENOUS at 13:40

## 2022-11-02 RX ADMIN — PANTOPRAZOLE SODIUM 40 MG: 40 TABLET, DELAYED RELEASE ORAL at 05:57

## 2022-11-02 RX ADMIN — FLUTICASONE PROPIONATE 2 SPRAY: 50 SPRAY, METERED NASAL at 09:40

## 2022-11-02 RX ADMIN — POLYETHYLENE GLYCOL 3350 17 G: 17 POWDER, FOR SOLUTION ORAL at 05:57

## 2022-11-02 RX ADMIN — TAMSULOSIN HYDROCHLORIDE 0.4 MG: 0.4 CAPSULE ORAL at 09:40

## 2022-11-02 NOTE — PROGRESS NOTES
Discharge instructions and medications reviewed with patient and wife. Pt. And wife Verbalized understanding. Denies questions. Discontinued IV without complications. Pt. tolerated well.

## 2022-11-02 NOTE — PROGRESS NOTES
Occupational Therapy  Facility/Department: [facility]  Occupational Therapy Initial Assessment    Name: Cintia Pappas  : 1933  MRN: [id number]  Date of Service: 2022    Discharge Recommendations:  Home with assist PRN, Home with Home health OT  OT Equipment Recommendations  Other: discussed shower chair vs TTB for safety and energy conservation with bathing. Home OT to further assess. Cintia Pappas scored a 20/24 on the AM-PAC ADL Inpatient form. Current research shows that an AM-PAC score of 18 or greater is typically associated with a discharge to the patient's home setting. Based on the patient's AM-PAC score, and their current ADL deficits, it is recommended that the patient have 2-3 sessions per week of Occupational Therapy at d/c to increase the patient's independence. At this time, this patient demonstrates the endurance and safety to discharge home with assist prn and a home OT follow up treatment frequency of 2-3x/wk. Please see assessment section for further patient specific details. If patient discharges prior to next session this note will serve as a discharge summary. Please see below for the latest assessment towards goals. Patient Diagnosis(es): The primary encounter diagnosis was Lightheadedness. Diagnoses of Pancytopenia ([address] and Near syncope were also pertinent to this visit. Past Medical History:  has a past medical history of Collapsed lung, Hyperlipidemia, Hypertension, Leg fracture, right, Post-herpetic polyneuropathy, and Prostate cancer ([address] Past Surgical History:  has a past surgical history that includes Appendectomy; Tonsillectomy; Colonoscopy (2020); Colonoscopy (N/A, 11/3/2020); Colonoscopy (11/3/2020); and Colonoscopy (N/A, 2021). Assessment   Performance deficits / Impairments: Decreased functional mobility ; Decreased ADL status; Decreased endurance;Decreased high-level IADLs;Decreased strength  Assessment: Pt is an 80 y.o. 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 PTA, pt living with wife in [place] house with 13 YU to enter, independent ADLs and fxl mobility using cane prn, has been using RW for the past week. Wife manages homemaking. Pt currently functioning below baseline d/t the above deficits, today requiring SBA fxl transfers/mobility with RW, min A LB dressing, SBA grooming and toileting, and anticipate pt would require overall min A for ADLs. Will cont to see on acute to address the above limitations and maximize pt's safety and independence. Anticipate pt will be safe to return home with wife's assist prn and home OT. Prognosis: Good  Decision Making: Medium Complexity  History: see above  REQUIRES OT FOLLOW-UP: Yes  Activity Tolerance  Activity Tolerance: Patient Tolerated treatment well  Activity Tolerance Comments: pt denied lightheadedness throughout session        Plan   Occupational Therapy Plan  Times Per Week: 3-5  Times Per Day: Once a day  Current Treatment Recommendations: Strengthening, Balance training, Functional mobility training, Endurance training, Safety education & training, Equipment evaluation, education, & procurement, Self-Care / ADL, Patient/Caregiver education & training     Restrictions  Restrictions/Precautions  Restrictions/Precautions: Fall Risk  Position Activity Restriction  Other position/activity restrictions: neutropenic precautions    Subjective   General  Chart Reviewed: Yes  Additional Pertinent Hx: Pt is an 80 y.o. 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 Pt ambulating to BR with PCA's assist, agreeable to participate in therapy.  Pt denies pain, but c/o fatigue and perspiring with minimal activity. Social/Functional History  Social/Functional History  Lives With: Spouse  Type of Home: House  Home Layout: One level  Home Access: Stairs to enter with rails  Entrance Stairs - Number of Steps: 13 YU from basement  Bathroom Shower/Tub: Tub/Shower unit, Walk-in shower (walk-in shower in basement, tub/shower on main level. Pt uses tub/shower)  Bathroom Toilet: Handicap height  Bathroom Equipment: Grab bars around toilet  Bathroom Accessibility: Walker accessible  Home Equipment: Walker, rolling, Cane, quad  ADL Assistance: Independent  Homemaking Assistance: Needs assistance (wife manages)  Ambulation Assistance: Independent (with cane prn, has been using RW for the past week)  Transfer Assistance: Independent  Active : Yes  Patient's  Info: pt can drive short distances, wife does most of the driving  Occupation: Retired  Leisure & Hobbies: parachuting       Objective     Safety Devices  Type of Devices: Call light within reach;Gait belt;Left in chair;Chair alarm in place    Balance  Sitting: Intact  Standing: With support (SBA at [facility])  Gait  Overall Level of Assistance: Stand-by assistance (Pt completed fxl mobility ~10 ft, ~25 ft, ~45 ft with RW and SBA.)    Transfers  Sit to stand: Stand by assistance  Stand to sit: Stand by assistance  Transfer Comments: to/from RW, VC's for hand placement  Toilet Transfers  Toilet - Technique: Ambulating  Equipment Used: Grab bars  Toilet Transfer: Stand by assistance    AROM: Within functional limits  PROM: Within functional limits  Strength:  Within functional limits  Coordination: Within functional limits  Tone: Normal  Sensation: Intact (pt denies N/T in stanley hands)    ADL  Grooming: Stand by assistance  Grooming Skilled Clinical Factors: standing at sink to wash hands  LE Dressing: Minimal assistance  LE Dressing Skilled Clinical Factors: to don pants  Toileting: Stand by assistance  Toileting Skilled Clinical Factors: standing at commode to void urine  Additional Comments: Anticipate pt is independent feeding, min A bathing and dressing based on ROM/strength, balance, endurance. Activity Tolerance  Activity Tolerance: Patient tolerated evaluation without incident    Bed mobility  Bed Mobility Comments: BENJA, pt OOB at start/end of session    Vision  Vision: Within Functional Limits (for assessment)  Hearing  Hearing: Exceptions to [facility]  Hearing Exceptions: Hard of hearing/hearing concerns; No hearing aid    Cognition  Overall Cognitive Status: WFL  Orientation  Overall Orientation Status: Within Functional Limits    Education Given To: Patient  Education Provided: Role of Therapy;Plan of Care;Transfer Training; Fall Prevention Strategies; Energy Conservation;Equipment;ADL Adaptive Strategies  Education Provided Comments: shower chair for safety and energy conservation (TTB vs shower chair with back), using RW when going home  Barriers to Learning: None  Education Outcome: Verbalized understanding;Demonstrated understanding                            AM-PAC Score        AM-[facility] Inpatient Daily Activity Raw Score: 20 (11/02/22 1155)  AM-PAC Inpatient ADL T-Scale Score : 42.03 (11/02/22 1155)  ADL Inpatient CMS 0-100% Score: 38.32 (11/02/22 1155)  ADL Inpatient CMS G-Code Modifier : CJ (11/02/22 1155)      Goals  Short Term Goals  Time Frame for Short Term Goals: Prior to d/c:  Short Term Goal 1: Pt will bathe with supervision/mod I. Short Term Goal 2: Pt will dress with supervision/mod I. Short Term Goal 3: Pt will toilet with supervision/mod I. Short Term Goal 4: Pt will complete fxl mobility and fxl transfers to/from ADL surfaces with supervision/mod I using LRAD prn. Short Term Goal 5: Pt will complete 10 reps of B UE therex in all planes to improve strength for ADLs and fxl transfers. Long Term Goals  Time Frame for Long Term Goals : STGs=LTGs  Patient Goals   Patient goals : to return home.        Therapy Time Individual Concurrent Group Co-treatment   Time In 9898         Time Out 1156         Minutes 39         Timed Code Treatment Minutes: [address], OTR/L [id number]

## 2022-11-02 NOTE — H&P
[facility] Internal Medicine  History and Physical    Patient's PCP: Wade Apple MD  Code Status: Full Code  History Obtained From:  patient    CC: weakness     HPI: Lashae Chino is an 59-year-old man with a past medical history significant for myelodysplastic disorder, hypertension, prostate cancer and frequent PVCs who presented to the emergency department yesterday after episodes of lightheadedness. Patient felt lightheaded upon standing. His appetite has been poor lately. He was due to follow with hematology for initiation of treatment for MDS. He does not report any nausea, vomiting or diarrhea. No chest pain or shortness of breath. He has been taking metoprolol for frequent PVCs. No recent sick contacts. He does not have any fever or chills. No melena or hematochezia. Past Medical / Surgical History:    Past Medical History:   Diagnosis Date    Collapsed lung     H/O    Hyperlipidemia     Hypertension     Leg fracture, right     H/O    Post-herpetic polyneuropathy     Prostate cancer [facility])      Past Surgical History:   Procedure Laterality Date    APPENDECTOMY      COLONOSCOPY  11/03/2020    COLONOSCOPY N/A 11/3/2020    COLONOSCOPY with ABLATION performed by Ariella Perales MD at [address] [facility]  11/3/2020    COLONOSCOPY WITH BIOPSY performed by Ariella Perales MD at [address] [facility] N/A 5/25/2021    COLONOSCOPY POLYPECTOMY SNARE/COLD BIOPSY performed by Ariella Perales MD at [address]         Medications Prior to Admission:    No current facility-administered medications on file prior to encounter. Current Outpatient Medications on File Prior to Encounter   Medication Sig Dispense Refill    LORazepam (ATIVAN) 0.5 MG tablet Take 0.5 mg by mouth nightly as needed for Anxiety (sleep/anxiety).       HYDROcodone-acetaminophen (NORCO) 5-325 MG per tablet Take 1 tablet by mouth 2 times daily as needed for Pain.      metoprolol succinate (TOPROL XL) 25 MG extended release tablet Take 1 tablet by mouth daily 90 tablet 2    rosuvastatin (CRESTOR) 10 MG tablet TAKE ONE TABLET BY MOUTH EVERY NIGHT AT BEDTIME 90 tablet 1    tamsulosin (FLOMAX) 0.4 MG capsule Take 1 capsule by mouth daily 90 capsule 2    fluticasone (FLONASE) 50 MCG/ACT nasal spray 2 sprays by Each Nostril route daily 16 g 5    aspirin 81 MG EC tablet Take 81 mg by mouth daily      polyethylene glycol (GLYCOLAX) packet Take 17 g by mouth daily      omeprazole (PRILOSEC) 20 MG capsule Take 20 mg by mouth Daily         Allergies:  Cephalosporins and Cefadroxil    Social History:   TOBACCO:   reports that he has quit smoking. He has never used smokeless tobacco.     ETOH:   reports no history of alcohol use. Family History:       Problem Relation Age of Onset    No Known Problems Mother     No Known Problems Father      ROS:     All other systems reviewed and are negative. PHYSICAL EXAM:  /72   Pulse 93   Temp 98.3 °F (36.8 °C) (Oral)   Resp 18   Ht 6' 1\" (1.854 m)   Wt 159 lb 2.8 oz (72.2 kg)   SpO2 99%   BMI 21.00 kg/m²     Physical Exam  Vitals reviewed. Constitutional:       General: He is not in acute distress. Appearance: Normal appearance. HENT:      Head: Normocephalic. Dry oral mucosa  Eyes:      Extraocular Movements: Extraocular movements intact. Conjunctiva/sclera: Conjunctivae normal.   Cardiovascular:      Heart sounds: Regular rate and rhythm, no murmurs poor skin turgor  Pulmonary:      Effort: Pulmonary effort is normal.   Musculoskeletal:      Right lower leg: No edema. Left lower leg: No edema. Skin:     General: Skin is warm and dry. Neurological:      General: No focal deficit present. Mental Status: He is alert and oriented to person, place, and time. Mental status is at baseline.    Psychiatric:         Mood and Affect: Mood normal.        LABS:  Recent Labs     11/01/22  0818   WBC 1.1*   HGB 7.6*   HCT 23.1*   PLT 59* Recent Labs     11/01/22  0818   *   K 4.3      CO2 19*   BUN 12   CREATININE 0.9   GLUCOSE 114*     Recent Labs     11/01/22  0818   AST 23   ALT 25   BILITOT 0.7   ALKPHOS 67     Recent Labs     11/01/22 0818   TROPONINI <0.01     No results for input(s): BNP in the last 72 hours. No results found for: PHART, CQE5ZMG, PO2ART  No results for input(s): INR in the last 72 hours. Recent Labs     11/01/22 0923   COLORU Yellow   PHUR 8.5*   CLARITYU Clear   SPECGRAV 1.007   LEUKOCYTESUR Negative   UROBILINOGEN 0.2   BILIRUBINUR Negative   BLOODU Negative   GLUCOSEU Negative         U/A:    Lab Results   Component Value Date/Time    NITRITE N 09/25/2015 12:16 PM    COLORU Yellow 11/01/2022 09:23 AM    CLARITYU Clear 11/01/2022 09:23 AM    SPECGRAV 1.007 11/01/2022 09:23 AM    LEUKOCYTESUR Negative 11/01/2022 09:23 AM    BLOODU Negative 11/01/2022 09:23 AM    GLUCOSEU Negative 11/01/2022 09:23 AM     CT head: No acute intracranial abnormality    Chest x-ray: No evidence of cardiopulmonary disease    Assessment & Plan:    Army Dickerson is a 80 y.o. male who was admitted with Pancytopenia ([facility] [address] Principal Problem:    Pancytopenia ([facility] [address] secondary to MDS  Orthostatic hypotension  History of PVCs  Anxiety  Hypertension      Patient presents with poor appetite, recent diagnosis of MDS with severe neutropenia, macrocytic anemia and thrombocytopenia. He is following with hematology. He does not have any fevers. He has responded profoundly to a fluid bolus and maintenance fluids overnight. Plan  - Continue lactated Ringer's at 75 cc/h. Repeat echocardiogram.  Unremarkable telemetry. Stop metoprolol, even if this is benefiting PVCs he is susceptible to orthostasis. His blood pressure is elevated today, will monitor before starting any other agents. For his pancytopenia, MDS and severe neutropenia he is not having any fevers. Neutropenic precautions. He is due to follow with hematology outpatient for initiation of treatment. Dispo: Patient can likely be discharged later today after physical therapy evaluation echocardiogram.  He will need close follow-up with hematology outpatient. Janeice Merlin, MD   11/2/2022 7:32 AM    Documentation was done using voice recognition dragon software. Every effort was made to ensure accuracy; however, inadvertent unintentional computerized transcription errors may be present.

## 2022-11-02 NOTE — PLAN OF CARE
Problem: Discharge Planning  Goal: Discharge to home or other facility with appropriate resources  11/2/2022 1708 by Kathy Lee RN  Outcome: Completed  11/2/2022 0844 by Kathy Lee RN  Outcome: Progressing  11/2/2022 0312 by Bhavesh Lieberman RN  Outcome: Progressing     Problem: Safety - Adult  Goal: Free from fall injury  11/2/2022 1708 by Kathy Lee RN  Outcome: Completed  11/2/2022 0844 by Kathy Lee RN  Outcome: [place] (Taken 11/2/2022 0823)  Free From Fall Injury:   Hu Albright family/caregiver on patient safety   Based on caregiver fall risk screen, instruct family/caregiver to ask for assistance with transferring infant if caregiver noted to have fall risk factors  11/2/2022 0312 by Bhavesh Lieberman RN  Outcome: Progressing     Problem: ABCDS Injury Assessment  Goal: Absence of physical injury  11/2/2022 1708 by Kathy Lee RN  Outcome: Completed  11/2/2022 0844 by Kathy Lee RN  Outcome: Progressing  Flowsheets (Taken 11/2/2022 0802)  Absence of Physical Injury: Implement safety measures based on patient assessment  11/2/2022 0312 by Bhavesh Lieberman RN  Outcome: Progressing     Problem: Nutrition Deficit:  Goal: Optimize nutritional status  11/2/2022 1708 by Kathy Lee RN  Outcome: Completed  11/2/2022 1219 by Shellie Crook RD, LD  Outcome: Progressing

## 2022-11-02 NOTE — PLAN OF CARE
Problem: Discharge Planning  Goal: Discharge to home or other facility with appropriate resources  11/2/2022 0844 by Aby Garrido RN  Outcome: Progressing  11/2/2022 0312 by Martinez Lange RN  Outcome: Progressing     Problem: Safety - Adult  Goal: Free from fall injury  11/2/2022 0844 by Aby Garrido RN  Outcome: Progressing  11/2/2022 0312 by Martinez Lange RN  Outcome: Progressing     Problem: ABCDS Injury Assessment  Goal: Absence of physical injury  11/2/2022 0844 by Aby Garrido RN  Outcome: Progressing  11/2/2022 0312 by Martinez Lange RN  Outcome: Progressing

## 2022-11-02 NOTE — CARE COORDINATION
INITIAL CASE MANAGEMENT ASSESSMENT    Reviewed chart, met with patient to assess possible discharge needs. Explained Case Management role/services. Living Situation: Lives with his wife in a house with 13 steps to enter. ADLs: Independent     DME: Fracisco Ramirez, Chair lift    PT/OT Recs: Discharge Recommendations:  Home with Home health PT, Patient would benefit from continued therapy after discharge, Home with assist PRN   Sarah Rapp scored a 20/24 on the AM-PAC short mobility form. At this time, this patient demonstrates the endurance and safety to discharge home with prn assist and home PT (home vs OP services) and a follow up treatment frequency of 2-3x/wk. Active Services: None     Transportation: Active / Wife can transport     Medications: [employer] in [place]/No barriers    PCP: Gerhard Lilly MD      HD/PD: N/A    PLAN/COMMENTS: Plan is to return to home with wife. SW/CM provided contact information for patient or family to call with any questions. SW/CM will follow and assist as needed.    Electronically signed by Carmen Lisa RN on 11/2/2022 at 12:09 PM

## 2022-11-02 NOTE — PROGRESS NOTES
Pt transported to front entrance via w/c with all personal belongings. No distress or discomfort noted upon discharge.

## 2022-11-02 NOTE — PLAN OF CARE
Problem: Discharge Planning  Goal: Discharge to home or other facility with appropriate resources  11/2/2022 0312 by Mary Gamble RN  Outcome: Progressing  11/1/2022 1545 by Devon Chang RN  Outcome: Progressing  Flowsheets  Taken 11/1/2022 1538  Discharge to home or other facility with appropriate resources:   Identify barriers to discharge with patient and caregiver   Arrange for needed discharge resources and transportation as appropriate  Taken 11/1/2022 1524  Discharge to home or other facility with appropriate resources: Identify barriers to discharge with patient and caregiver     Problem: Safety - Adult  Goal: Free from fall injury  11/2/2022 0312 by Mary Gamble RN  Outcome: Progressing  11/1/2022 1545 by Devon Chang, RN  Outcome: Progressing     Problem: ABCDS Injury Assessment  Goal: Absence of physical injury  11/2/2022 0312 by aMry Gamble RN  Outcome: Progressing  11/1/2022 1545 by Devon Chang, RN  Outcome: Progressing

## 2022-11-02 NOTE — ACP (ADVANCE CARE PLANNING)
Advance Care Planning     Advance Care Planning Activator (Inpatient)  Conversation Note      Date of ACP Conversation: 11/2/2022     Conversation Conducted with: Patient with Decision Making Capacity    ACP Activator: Carol Wu 45 Decision Maker:     Current Designated Health Care Decision Maker:     Primary Decision Maker: [facility] - [phone]    Secondary Decision Maker: Letha Miller - Child - [phone]    Care Preferences    Ventilation: \"If you were in your present state of health and suddenly became very ill and were unable to breathe on your own, what would your preference be about the use of a ventilator (breathing machine) if it were available to you? \"      Would the patient desire the use of ventilator (breathing machine)?: yes    \"If your health worsens and it becomes clear that your chance of recovery is unlikely, what would your preference be about the use of a ventilator (breathing machine) if it were available to you? \"     Would the patient desire the use of ventilator (breathing machine)?: No      Resuscitation  \"CPR works best to restart the heart when there is a sudden event, like a heart attack, in someone who is otherwise healthy. Unfortunately, CPR does not typically restart the heart for people who have serious health conditions or who are very sick. \"    \"In the event your heart stopped as a result of an underlying serious health condition, would you want attempts to be made to restart your heart (answer \"yes\" for attempt to resuscitate) or would you prefer a natural death (answer \"no\" for do not attempt to resuscitate)? \" yes       [] Yes   [x] No   Educated Patient / Tati Diaz regarding differences between Advance Directives and portable DNR orders.     Length of ACP Conversation in minutes:  3    Conversation Outcomes:  [x] ACP discussion completed  [] Existing advance directive reviewed with patient; no changes to patient's previously recorded wishes  [] New Advance Directive completed  [] Portable Do Not Rescitate prepared for Provider review and signature  [] POLST/POST/MOLST/MOST prepared for Provider review and signature      Follow-up plan:    [] Schedule follow-up conversation to continue planning  [] Referred individual to Provider for additional questions/concerns   [] Advised patient/agent/surrogate to review completed ACP document and update if needed with changes in condition, patient preferences or care setting    [x] This note routed to one or more involved healthcare providers

## 2022-11-02 NOTE — DISCHARGE INSTR - COC
Continuity of Care Form    Patient Name: Mango Bach   :  1933  MRN:  [id number]    Admit date:  2022  Discharge date:  ***    Code Status Order: Full Code   Advance Directives:     Admitting Physician:  James Sharif MD  PCP: James Sharif MD    Discharging Nurse: [facility] Unit/Room#: D6V-9381/5885-56  Discharging Unit Phone Number: ***    Emergency Contact:   Extended Emergency Contact Information  Primary Emergency Contact: Sandra Castorena  Address: [address], [address] [place] [address] Phone: [phone]  Relation: Spouse  Secondary Emergency Contact: Jeffy Covarrubias, [address] [facility] Phone: [phone]  Mobile Phone: [phone]  Relation: Child    Past Surgical History:  Past Surgical History:   Procedure Laterality Date    APPENDECTOMY      COLONOSCOPY  2020    COLONOSCOPY N/A 11/3/2020    COLONOSCOPY with ABLATION performed by Mariah Leon MD at [address] [facility]  11/3/2020    COLONOSCOPY WITH BIOPSY performed by Mariah Leon MD at [address] [facility] N/A 2021    COLONOSCOPY POLYPECTOMY SNARE/COLD BIOPSY performed by Mariah Leon MD at [address]         Immunization History:   Immunization History   Administered Date(s) Administered    COVID-19, MODERNA BLUE border, Primary or Immunocompromised, (age 12y+), IM, 100 mcg/0.5mL 2021, 2021, 2022    COVID-19, MODERNA Booster BLUE border, (age 18y+), IM, 50mcg/0.25mL 2021    Influenza A (Q3X1-18) Vaccine PF IM 2009    Influenza Vaccine, unspecified formulation 2015, 2016    Influenza, FLUAD, (age 72 y+), Adjuvanted, 0.5mL 2021, 2022    Influenza, High Dose (Fluzone 65 yrs and older) 2015, 2016, 2017, 2018, 09/10/2020    Influenza, Triv, inactivated, subunit, adjuvanted, IM (Fluad 65 yrs and older) 2019    Pneumococcal Conjugate 13-valent (Jxjvkyr17) 07/17/2015    Pneumococcal Polysaccharide (Ugxkhmntn70) 10/03/2013    Zoster Live (Zostavax) 06/13/2011       Active Problems:  Patient Active Problem List   Diagnosis Code    Hyperlipidemia E78.5    Anxiety disorder F41.9    HTN (hypertension) I10    Actinic keratosis L57.0    Benign neoplasm of other specified sites of skin D23.9    Blood in stool K92.1    Constipation K59.00    History of nonmelanoma skin cancer Z85.828    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    Scleral hemorrhage of right eye H11.31    Encounter to establish care Z76.89    Pancytopenia [facility]) H63.463       Isolation/Infection:   Isolation            Neutropenic          Patient Infection Status       Infection Onset Added Last Indicated Last Indicated By Review Planned Expiration Resolved Resolved By    None active    Resolved    COVID-19 (Rule Out) 11/01/22 11/01/22 11/01/22 COVID-19, Rapid (Ordered)   11/01/22 Rule-Out Test Resulted            Nurse Assessment:  Last Vital Signs: BP (!) 163/50   Pulse 92   Temp 97.9 °F (36.6 °C) (Oral)   Resp 18   Ht 6' 1\" (1.854 m)   Wt 159 lb 2.8 oz (72.2 kg)   SpO2 99%   BMI 21.00 kg/m²     Last documented pain score (0-10 scale):    Last Weight:   Wt Readings from Last 1 Encounters:   11/02/22 159 lb 2.8 oz (72.2 kg)     Mental Status:  {IP PT MENTAL STATUS:68423}    IV Access:  { DANY IV ACCESS:276014875}    Nursing Mobility/ADLs:  Walking   {CHP DME LGWA:662869007}  Transfer  {CHP DME CIKY:160267543}  Bathing  {CHP DME OGUO:670593145}  Dressing  {CHP DME PMHH:175964375}  Toileting  {CHP DME QUWA:425454050}  Feeding  {CHP DME EDIR:729434822}  Med Admin  {CHP DME KKTY:660050939}  Med Delivery   { DANY MED Delivery:461657299}    Wound Care Documentation and Therapy:        Elimination:  Continence:    Bowel: {YES / CI:02733}  Bladder: {YES / FC:66686}  Urinary Catheter: {Urinary Catheter:152953309}   Colostomy/Ileostomy/Ileal Conduit: {YES / AM:86966} Date of Last BM: ***    Intake/Output Summary (Last 24 hours) at 2022 1629  Last data filed at 2022 1404  Gross per 24 hour   Intake 1661.41 ml   Output --   Net 1661.41 ml     I/O last 3 completed shifts:   In: 1061.4 [I.V.:554.4; IV YMHWDKPMD:242]  Out: 400 [Urine:400]    Safety Concerns:     [address] [employer] Safety Concerns:510661732}    Impairments/Disabilities:      [address] [employer] Impairments/Disabilities:560747546}    Nutrition Therapy:  Current Nutrition Therapy:   [employer] Diet List:766613993}    Routes of Feeding: {[facility] DME Other Feedings:001093494}  Liquids: {Slp liquid thickness:47730}  Daily Fluid Restriction: {CHP DME Yes amt example:743726800}  Last Modified Barium Swallow with Video (Video Swallowing Test): {Done Not Done VGTQ:368110929}    Treatments at the Time of Hospital Discharge:   Respiratory Treatments: ***  Oxygen Therapy:  {Therapy; copd oxygen:08974}  Ventilator:    {[facility] Vent ZVSR:836790291}    Rehab Therapies: {THERAPEUTIC INTERVENTION:7624947503}  Weight Bearing Status/Restrictions: [facility] Weight Bearin}  Other Medical Equipment (for information only, NOT a DME order):  {EQUIPMENT:219294360}  Other Treatments: ***    Patient's personal belongings (please select all that are sent with patient):  {[facility] DME Belongings:684863181}    RN SIGNATURE:  {Esignature:878227594}    CASE MANAGEMENT/SOCIAL WORK SECTION    Inpatient Status Date: 2022    Readmission Risk Assessment Score:  Readmission Risk              Risk of Unplanned Readmission:  9           Discharging to Facility/ Agency   Name: [address] [facility] [address]  Address:   [address] [place] [address], [address] [place] [address], [address] [place] [address]  Phone:  [phone]  Fax:  [phone]          / signature: Electronically signed by Jv Perez on 22 at 4:34 PM EDT    PHYSICIAN SECTION    Prognosis: Fair    Condition at Discharge: Stable    Rehab Potential (if transferring to Rehab): {Prognosis:4149451334}    Recommended Labs or Other Treatments After Discharge:     Physician Certification: I certify the above information and transfer of Joan Reed  is necessary for the continuing treatment of the diagnosis listed and that he requires [address] for less 30 days.      Update Admission H&P: No change in H&P    PHYSICIAN SIGNATURE:  Electronically signed by Ramo Becker MD on 11/2/22 at 4:30 PM EDT

## 2022-11-02 NOTE — PROGRESS NOTES
Comprehensive Nutrition Assessment    Type and Reason for Visit:  Initial, Positive Nutrition Screen    Nutrition Recommendations/Plan:   Continue regular diet  Begin Ensure Enlive BID     Malnutrition Assessment:  Malnutrition Status:  No malnutrition (11/02/22 1207)    Context:  Chronic Illness       Nutrition Assessment:    Positive nutrition screen. Hx prostate cancer. Wt hx stable per EMR. On regular diet with intakes 26-50%. Will trial Ensure Enlive and monitor for acceptance. Nutrition Related Findings:    No edema. +BM10/27 Wound Type: None       Current Nutrition Intake & Therapies:    Average Meal Intake: 26-50%  Average Supplements Intake: None Ordered  ADULT DIET; Regular    Anthropometric Measures:  Height: 6' 1\" (185.4 cm)  Ideal Body Weight (IBW): 184 lbs (84 kg)    Current Body Weight: 159 lb (72.1 kg), 86.4 % IBW. Weight Source: Bed Scale  Current BMI (kg/m2): 21  Weight Adjustment For: No Adjustment  BMI Categories: Normal Weight (BMI 18.5-24. 9)    Estimated Daily Nutrient Needs:  Energy Requirements Based On: Kcal/kg  Weight Used for Energy Requirements: Current  Energy (kcal/day): 1532-6265 (25-30kcal/72kg)  Weight Used for Protein Requirements: Current  Protein (g/day):  (1.2-1.4g/72kg)  Method Used for Fluid Requirements: 1 ml/kcal  Fluid (ml/day): 1 ml/kcal    Nutrition Diagnosis:   Inadequate oral intake related to inadequate protein-energy intake as evidenced by intake 26-50%    Nutrition Interventions:   Food and/or Nutrient Delivery: Continue Current Diet, Start Oral Nutrition Supplement  Nutrition Education/Counseling: Education not indicated  Coordination of Nutrition Care: Continue to monitor while inpatient    Goals:  Goals: PO intake 50% or greater     Nutrition Monitoring and Evaluation:   Behavioral-Environmental Outcomes: None Identified  Food/Nutrient Intake Outcomes: Food and Nutrient Intake, Supplement Intake  Physical Signs/Symptoms Outcomes: Weight    Discharge Planning:     Too soon to determine     Harriet Avila, [address] [place] [address], LD  Contact: [phone]

## 2022-11-02 NOTE — CARE COORDINATION
DISCHARGE SUMMARY     DATE OF DISCHARGE: 11/02/2022    DISCHARGE DESTINATION: Home with spouse    HOME CARE    Discharging to Facility/ Agency   Name: [facility]  Address:   [address], [address] [place]  Phone:  [phone]  Fax:  [phone]      TRANSPORTATION: Spouse will transport pt home      COMMENTS: BLANCA spoke with pts spouse who stated that she has no agency preference to [facility] services. The Plan for Transition of Care is related to the following treatment goals: Strengthening    The pts spouse was provided with a choice of provider and agrees   with the discharge plan. [x] Yes [] No    Freedom of choice list was provided with basic dialogue that supports the patient's individualized plan of care/goals, treatment preferences and shares the quality data associated with the providers. [x] Yes [] No    BLANCA contacted [facility] at [facility] who stated that she will pull orders and arranged [facility] services.    DONTA Barnes  [phone]  Electronically signed by Yeniefr Kaplan on 11/2/2022 at 4:36 PM

## 2022-11-02 NOTE — PROGRESS NOTES
Physical Therapy  Facility/Department: [address] MED SURG  Physical Therapy Initial Assessment  If patient discharges prior to next session this note will serve as a discharge summary. Please see below for the latest assessment towards goals. Name: Harris Howell  : 1933  MRN: [id number]  Date of Service: 2022    Discharge Recommendations:  Home with Home health PT, Patient would benefit from continued therapy after discharge, Home with assist PRN   Harris Howell scored a 20/24 on the AM-PAC short mobility form. Current research shows that an AM-PAC score of 18 or greater is typically associated with a discharge to the patient's home setting. Based on the patient's AM-PAC score and their current functional mobility deficits, it is recommended that the patient have 2-3 sessions per week of Physical Therapy at d/c to increase the patient's independence. At this time, this patient demonstrates the endurance and safety to discharge home with prn assist and home PT (home vs OP services) and a follow up treatment frequency of 2-3x/wk. Please see assessment section for further patient specific details. PT Equipment Recommendations  Equipment Needed: No      Patient Diagnosis(es): The primary encounter diagnosis was Lightheadedness. Diagnoses of Pancytopenia ([address] and Near syncope were also pertinent to this visit. Past Medical History:  has a past medical history of Collapsed lung, Hyperlipidemia, Hypertension, Leg fracture, right, Post-herpetic polyneuropathy, and Prostate cancer ([address] Past Surgical History:  has a past surgical history that includes Appendectomy; Tonsillectomy; Colonoscopy (2020); Colonoscopy (N/A, 11/3/2020); Colonoscopy (11/3/2020); and Colonoscopy (N/A, 2021). Assessment   Assessment: The pt is an 81yo male who presented to the ED with episodes of feeling lightheaded; decreased appetite.  The pt lives with his wife in a ranch style house that they enter thru the basement. PTA the pt was using a cane intermittantl but in the past couple of weeks has been using the walker due to weakness. The pt has typically been indep in self-care and still drives. PMHx: HTN, R LE fx, Post-herpetic polyneuropathy, prostate Ca, recent dx of MDS, frequent PVC's     Today, the pt demonstrated that he is functioning below his baseline and is limited by his generalized weakness and his need for a walker for ambulation. He was able to come to standing with SBA and ambulated with the walker 45 feet with SBA. He had no LOB. Anticipate that the pt will be safe for home with prn assist of wife and home PT. He is agreeable to home PT and has equipment needed at this time. Will con't to follow. Therapy Prognosis: Good  Decision Making: Medium Complexity  Barriers to Learning: none  Requires PT Follow-Up: Yes  Activity Tolerance  Activity Tolerance: Patient tolerated evaluation without incident     Plan   Physcial Therapy Plan  General Plan: 3-5 times per week  Current Treatment Recommendations: Strengthening, Balance training, Functional mobility training, Transfer training, Gait training, Stair training, Therapeutic activities, Patient/Caregiver education & training, Safety education & training, Equipment evaluation, education, & procurement  Safety Devices  Type of Devices: Call light within reach, Gait belt, Left in chair, Chair alarm in place     Restrictions  Restrictions/Precautions  Restrictions/Precautions: Fall Risk  Position Activity Restriction  Other position/activity restrictions: neutropenic precautions     Subjective   General  Chart Reviewed: Yes  Additional Pertinent Hx: Per Ruchi Amaral MD H&P on 11-2-2022: The pt is an 81yo male who presented to the ED with episodes of feeling lightheaded; decreased appetite.      PMHx: HTN, R LE fx, Post-herpetic polyneuropathy, prostate Ca, recent dx of MDS, frequent PVC's  Response To Previous Treatment: Not applicable  Referring Practitioner: Margo Harrison MD  Referral Date : 11/01/22  Diagnosis: Pancytopenia, Orthostatic hypotension  Follows Commands: Within Functional Limits  Subjective  Subjective: the pt was found to be getting up on his own to the bathroom despite alarms sounding; his wife seated on the couch; no reports or pain and lightheadedness         Social/Functional History  Social/Functional History  Lives With: Spouse  Type of Home: House  Home Layout: One level  Home Access: Stairs to enter with rails  Entrance Stairs - Number of Steps: 13 YU from basement  Bathroom Shower/Tub: Tub/Shower unit, Walk-in shower (walk-in shower in basement, tub/shower on main level. Pt uses tub/shower)  Bathroom Toilet: Handicap height  Bathroom Equipment: Grab bars around toilet  Bathroom Accessibility: Walker accessible  Home Equipment: Walker, rolling, Cane, quad  ADL Assistance: Independent  Homemaking Assistance: Needs assistance (wife manages)  Ambulation Assistance: Independent (with cane prn, has been using RW for the past week)  Transfer Assistance: Independent  Active : Yes  Patient's  Info: pt can drive short distances, wife does most of the driving  Occupation: Retired  Leisure & Hobbies: parachuting  Vision/Hearing  Vision  Vision: Within Functional Limits (for assessment)  Hearing  Hearing: Exceptions to [facility]  Hearing Exceptions: Hard of hearing/hearing concerns; No hearing aid    Cognition   Orientation  Overall Orientation Status: Within Functional Limits  Cognition  Overall Cognitive Status: WFL     Objective           Gross Assessment  AROM: Within functional limits  Strength: Generally decreased, functional  Tone: Normal  Sensation: Intact       Bed mobility  Bed Mobility Comments: BENJA, pt OOB at start/end of session  Transfers  Sit to Stand: Stand by assistance  Stand to Sit: Stand by assistance;Contact guard assistance  Comment: max vc's for safety and hand placement when turning to sit in the chair  Ambulation  Surface: Level tile  Device: Rolling Walker  Assistance: Contact guard assistance;Stand by assistance  Quality of Gait: initially CGA then progressing to SBA nce proper height walker obtained; the pt had no LOB but does walk with a slowed pace and a flexed posture  Distance: 25 feet x 1, 45 feet x 1  Comments: the pt used the commode in standing requiring just SBA for safety     Balance  Sitting - Static: Good  Sitting - Dynamic: Good  Standing - Static: Good;- (at the commode and the sink)  Standing - Dynamic: Fair;+ (no device)           AM-PAC Score  AM-PAC Inpatient Mobility Raw Score : 20 (11/02/22 1152)  AM-PAC Inpatient T-Scale Score : 47.67 (11/02/22 1152)  Mobility Inpatient CMS 0-100% Score: 35.83 (11/02/22 1152)  Mobility Inpatient CMS G-Code Modifier : CJ (11/02/22 1152)          Goals  Short Term Goals  Time Frame for Short Term Goals: upon d/c  Short Term Goal 1: Bed mobility with mod I. Short Term Goal 2: Transfers sit <> stand with mod I. Short Term Goal 3: Ambulate with wh walker 100 feet with Sup. Short Term Goal 4: Negotiate flight of steps with rail with SBA.   Patient Goals   Patient Goals : to go home today       Education  Patient Education  Education Given To: Patient  Education Provided: Role of Therapy;Plan of Care  Education Method: Demonstration;Verbal  Barriers to Learning: Hearing  Education Outcome: Verbalized understanding;Continued education needed      Therapy Time   Individual Concurrent Group Co-treatment   Time In 1117         Time Out 1156         Minutes 39         Timed Code Treatment Minutes: 24 Minutes     Electronically signed by Bam Agee, PT [id number] on 11/2/2022 at 12:01 PM

## 2022-11-03 ENCOUNTER — CARE COORDINATION (OUTPATIENT)
Dept: CASE MANAGEMENT | Age: 87
End: 2022-11-03

## 2022-11-03 DIAGNOSIS — D61.818 PANCYTOPENIA (HCC): Primary | ICD-10-CM

## 2022-11-03 PROCEDURE — 1111F DSCHRG MED/CURRENT MED MERGE: CPT | Performed by: STUDENT IN AN ORGANIZED HEALTH CARE EDUCATION/TRAINING PROGRAM

## 2022-11-03 NOTE — CARE COORDINATION
[facility] Care Transitions Initial Follow Up Call    Call within 2 business days of discharge: Yes    Care Transition Nurse contacted the family by telephone to perform post hospital discharge assessment. Verified name and  with family as identifiers. Provided introduction to self, and explanation of the Care Transition Nurse role. Patient: Antionette Partida Patient : 1933   MRN: [id number]  Reason for Admission: near syncope  Discharge Date: 22 RARS: Readmission Risk Score: 17.3      Last Discharge  [address]       Date Complaint Diagnosis Description Type Department Provider    22 Dizziness Lightheadedness . .. ED to Hosp-Admission (Discharged) (ADMITTED) Joanie Elizondo MD; Lynne Granger. .. Was this an external facility discharge? No Discharge Facility: n/a    Challenges to be reviewed by the provider   Additional needs identified to be addressed with provider: No  none               Method of communication with provider: none. Spoke to [employer], pt wife and hippa verified. States pt is doing \"better\" today. Taking as sponge bath in a sitting position today. States pt is still very shaky and somewhat confused. Using a walker around the house and a cane into the bathroom as the walker does not fit. States the confusion is not new but has gradually gotten worse over the past month or so. No difficulty eating or drinking. Reviewed medication list. Wife took metoprolol out of daily regimen and 1111F placed. Reviewed the importance of a HFU with PCP. Stated his discharging BP and pulse were elevated. Wife has a BP cuff at home and states she will start checking this and temp and keeping a log for PCP. Declined needing help with HFU as she would like to wait a day or two to get settled. [facility] is scheduled to see pt tomorrow. [employer] will discuss getting in and out of the car and shower safely with therapy.  Discussed getting a  referral and this was declined at this time. No other questions or concerns at this time. Will continue to follow. Care Transition Nurse reviewed discharge instructions with family who verbalized understanding. The family was given an opportunity to ask questions and does not have any further questions or concerns at this time. Were discharge instructions available to patient? Yes. Reviewed appropriate site of care based on symptoms and resources available to patient including: PCP  Specialist. The family agrees to contact the PCP office for questions related to their healthcare. Advance Care Planning:   Does patient have an Advance Directive: to be discussed in next interview    Medication reconciliation was performed with family, who verbalizes understanding of administration of home medications. Medications reviewed, 1111F entered: yes    Was patient discharged with a pulse oximeter? no    Non-face-to-face services provided:  Obtained and reviewed discharge summary and/or continuity of care documents  Assessment and support for treatment adherence and medication management-med rec    Offered patient enrollment in the Remote Patient Monitoring (RPM) program for in-home monitoring:  to be discussed at next interview . Care Transitions 24 Hour Call    Do you have a copy of your discharge instructions?: Yes  Do you have all of your prescriptions and are they filled?: Yes  Have you been contacted by a [address]?: No  Have you scheduled your follow up appointment?: No  Do you have support at home?: Partner/Spouse/SO  Do you feel like you have everything you need to keep you well at home?: Yes  Are you an active caregiver in your home?: No  Care Transitions Interventions         Follow Up  Future Appointments   Date Time Provider Katlin Miller   1/23/2023  9:20 AM Candace oH MD [facility] Transition Nurse provided contact information.   Plan for follow-up call in 3-5 days based on severity of symptoms and risk factors.   Plan for next call: symptom management-dizzy, lightheaded,  follow-up appointment-pcp follow up    FELIPA Martin, RN   Care Transition Nurse  Mobile: [phone]

## 2022-11-09 ENCOUNTER — CARE COORDINATION (OUTPATIENT)
Dept: CASE MANAGEMENT | Age: 87
End: 2022-11-09

## 2022-11-09 NOTE — PROGRESS NOTES
Physician Progress Note      Sascha Doherty  CSN #:                  [id number]  :                       1933  ADMIT DATE:       2022 7:52 AM  DISCH DATE:        2022 5:43 PM  RESPONDING  PROVIDER #:        Mark Saez MD          QUERY TEXT:    Pt admitted with light-headedness. If possible, please document in progress   notes and discharge summary the relationship, if any, between. The medical record reflects the following:  Risk Factors: PMH- myelodysplastic disorder, PVC's. Clinical Indicators: VS- 98.5, 94, 22, 152/75 . ...... WBC 1.1, 7.6/23.1, PLT   59, Na 131, COo2 19. Arabella Miramontes CT Head- \"No evidence of acute intracranial   abnormality. Arabella Miramontes Per H&P on 22-His appetite has been poor lately. He has   responded profoundly to a fluid bolus and maintenance fluids overnight. Treatment: IVF bolus, CT Head, Telemetry, Continue IVF    Thank You,  Lita Berman RN BSN CDS [email or web address]  Options provided:  -- Light-headedness  possibly due to dehydration  -- Light-headedness  possibly due to, Please specify reason. -- Other - I will add my own diagnosis  -- Disagree - Not applicable / Not valid  -- Disagree - Clinically unable to determine / Unknown  -- Refer to Clinical Documentation Reviewer    PROVIDER RESPONSE TEXT:    This patient has light-headedness possibly due to dehydration.     Query created by: Mariajose Russell on 2022 6:37 AM      Electronically signed by:  Mark Saez MD 2022 8:34 AM

## 2022-11-09 NOTE — CARE COORDINATION
[facility] Care Transitions Follow Up Call    N Care Coordinator contacted the family by telephone to follow up after admission on -. Verified name and  with patient as identifiers. Patient: Mark Petersen  Patient : 1933   MRN: [id number]  Reason for Admission: pancytopenia, syncope  Discharge Date: 22 RARS: Readmission Risk Score: 17.3      Needs to be reviewed by the provider   Additional needs identified to be addressed with provider: Yes  medications-wife inquiring if patient can have something to help stimulate his appetite             Method of communication with provider: chart routing. LPN CC spoke with patient's wife, Elisha Horowitz, HIPAA verified. States patient is about the same r/t weakness. Confusion is improved. BP is \"not bad\" 498 systolic today. HC active. Ambulating much better with a cane. Appetite diminished, but is eating some ice cream. States he can't tolerate Ensure or Boost. Denies problems with bladder. Reports some constipation. Discussed adding fiber in the diet with various fruits and vegetables, Benefiber gummies, taking docusate. Went to [facility] yesterday. Denies medication changes. Denies needs. Taryn Rapp  [facility]  Care Transitions  [phone]    Addressed changes since last contact:  none  Discussed follow-up appointments. If no appointment was previously scheduled, appointment scheduling offered: Yes. Is follow up appointment scheduled within 7 days of discharge? Yes. Follow Up  Future Appointments   Date Time Provider Katlin Miller   2023  9:20 AM Wade Apple MD [facility] [place] - [place]     Non-[facility] follow up appointment(s): NA    LPN Care Coordinator reviewed medical action plan and red flags with family and discussed any barriers to care and/or understanding of plan of care after discharge.  Discussed appropriate site of care based on symptoms and resources available to patient including: PCP  Specialist  Urgent care clinics  Home health  When to call 911. The family agrees to contact the PCP office for questions related to their healthcare. Advance Care Planning:   reviewed and current. Patients top risk factors for readmission: medical condition-syncope, pancytopenia  Interventions to address risk factors: Assessment and support for treatment adherence and medication management-denied new or changed    Offered patient enrollment in the Remote Patient Monitoring (RPM) program for in-home monitoring: Yes, but did not enroll at this time. Care Transitions Subsequent and Final Call    Subsequent and Final Calls  Do you have any ongoing symptoms?: No  Have your medications changed?: No  Do you have any questions related to your medications?: No  Do you currently have any active services?: Yes  Are you currently active with any services?: Home Health  Do you have any needs or concerns that I can assist you with?: No  Identified Barriers: None  Care Transitions Interventions  Other Interventions:             LPN Care Coordinator provided contact information for future needs. Plan for follow-up call in 7-10 days based on severity of symptoms and risk factors. Plan for next call: symptom management-weakness, diminished appetite  self management-ambulation with cane, BP  medication management-new or changed  community resources-[facility] , [facility] Transitions Follow Up Call    2022    Patient: Mango Bach  Patient : 1933   MRN: [id number]  Reason for Admission: near syncope  Discharge Date: 22 RARS: Readmission Risk Score: 17.3         Spoke with: NA    Attempted to reach patient via phone for transition call. VM left stating purpose of call along with my contact information requesting a return call.    Arabella Horn LPN [address] [place] [address]  Care Transitions  [phone]    Care Transitions Subsequent and Final Call    Subsequent and Final Calls  Care Transitions Interventions  Other Interventions:              Follow Up  Future Appointments   Date Time Provider Department Center   1/23/2023  9:20 AM Jerel Carmona MD [facility] [place] - PAYAL Jeffries LPN

## 2022-11-16 ENCOUNTER — CARE COORDINATION (OUTPATIENT)
Dept: CASE MANAGEMENT | Age: 87
End: 2022-11-16

## 2022-11-16 NOTE — CARE COORDINATION
[facility] [place] [place]itions Follow Up Call    Care Transition Nurse contacted the family by telephone to follow up after admission. Verified name and  with family as identifiers. Patient: Stephania Peabody  Patient : 1933   MRN: [id number]  Reason for Admission: pancytopenia; syncope  Discharge Date: 22 RARS: Readmission Risk Score: 17.3      Needs to be reviewed by the provider   Additional needs identified to be addressed with provider: No  none             Method of communication with provider: none. Pt's wife [facility] states he's doing better/ Reports he's eating better and walking more. States he's been working with therapy on exercising caution with standing up or changing positions and that's helped. He is getting around with a cane now. He has a f/u appt with PCP on Friday. States his BP has been \"good\" but she doesn't have it in front of her. States the home care nurse has been pleased with his numbers/ He is taking medications as prescribed and they deny questions or concerns at this time. Addressed changes since last contact:  none  Discussed follow-up appointments. If no appointment was previously scheduled, appointment scheduling offered: Yes. Is follow up appointment scheduled within 7 days of discharge? scheduled. Follow Up  Future Appointments   Date Time Provider Katlin Miller   2022 11:20 AM Gearold Severe, MD \[facility]\"" [place]   2023  9:20 AM Gearold Severe, MD \[facility]\"" [place] - [place]     Non-[facility] follow up appointment(s):     Care Transition Nurse reviewed discharge instructions, medical action plan, and red flags with family and discussed any barriers to care and/or understanding of plan of care after discharge. Discussed appropriate site of care based on symptoms and resources available to patient including: PCP  Home health  When to call 911. The family agrees to contact the PCP office for questions related to their healthcare.      Patients top risk factors for readmission: functional physical ability, falls, and medical condition-   Interventions to address risk factors: Obtained and reviewed discharge summary and/or continuity of care documents and Assessment and support for treatment adherence and medication management-      Care Transitions Subsequent and Final Call    Subsequent and Final Calls  Care Transitions Interventions  Other Interventions:             Care Transition Nurse provided contact information for future needs. Plan for follow-up call in 5-7 days based on severity of symptoms and risk factors.   Plan for next call: symptom management-   self management-     Flakito Fritz

## 2022-11-23 ENCOUNTER — CARE COORDINATION (OUTPATIENT)
Dept: CASE MANAGEMENT | Age: 87
End: 2022-11-23

## 2022-11-23 NOTE — CARE COORDINATION
[address] Transitions Follow Up Call    2022    Patient: Cintia Pappas  Patient : 1933   MRN: [id number]  Reason for Admission: pancytopenia; syncope  Discharge Date: 22 RARS: Readmission Risk Score: 17.3         Spoke with: NA    Attempted to reach patient via phone for transition call. VM left stating purpose of call along with my contact information requesting a return call. Nu Myles LPN [address] [place] [phone]    Care Transitions Subsequent and Final Call    Subsequent and Final Calls  Are you currently active with any services?: Home Health  Care Transitions Interventions  Other Interventions:              Follow Up  Future Appointments   Date Time Provider Katlin Miller   2023 11:00 AM Ibrahima Mendoza MD [facility] [place], LPN

## 2022-11-28 ENCOUNTER — APPOINTMENT (OUTPATIENT)
Dept: CT IMAGING | Age: 87
DRG: 809 | End: 2022-11-28
Payer: MEDICARE

## 2022-11-28 ENCOUNTER — HOSPITAL ENCOUNTER (INPATIENT)
Age: 87
LOS: 2 days | Discharge: HOSPICE | DRG: 809 | End: 2022-11-30
Attending: EMERGENCY MEDICINE | Admitting: STUDENT IN AN ORGANIZED HEALTH CARE EDUCATION/TRAINING PROGRAM
Payer: MEDICARE

## 2022-11-28 DIAGNOSIS — D61.818 PANCYTOPENIA (HCC): Primary | ICD-10-CM

## 2022-11-28 DIAGNOSIS — D61.9 ANEMIA DUE TO BONE MARROW FAILURE, UNSPECIFIED BONE MARROW FAILURE TYPE (HCC): ICD-10-CM

## 2022-11-28 DIAGNOSIS — I95.89 OTHER SPECIFIED HYPOTENSION: ICD-10-CM

## 2022-11-28 DIAGNOSIS — E86.0 DEHYDRATION: ICD-10-CM

## 2022-11-28 DIAGNOSIS — D46.9 MYELODYSPLASTIC SYNDROME (HCC): ICD-10-CM

## 2022-11-28 DIAGNOSIS — R19.7 DIARRHEA, UNSPECIFIED TYPE: ICD-10-CM

## 2022-11-28 PROBLEM — D64.9 ANEMIA: Status: ACTIVE | Noted: 2022-11-28

## 2022-11-28 LAB
A/G RATIO: 1 (ref 1.1–2.2)
ABO/RH: NORMAL
ALBUMIN SERPL-MCNC: 2.7 G/DL (ref 3.4–5)
ALP BLD-CCNC: 64 U/L (ref 40–129)
ALT SERPL-CCNC: 30 U/L (ref 10–40)
ANION GAP SERPL CALCULATED.3IONS-SCNC: 12 MMOL/L (ref 3–16)
ANTIBODY SCREEN: NORMAL
AST SERPL-CCNC: 34 U/L (ref 15–37)
BASOPHILS ABSOLUTE: 0 K/UL (ref 0–0.2)
BASOPHILS RELATIVE PERCENT: 2.5 %
BILIRUB SERPL-MCNC: 0.8 MG/DL (ref 0–1)
BILIRUBIN URINE: NEGATIVE
BLOOD, URINE: ABNORMAL
BUN BLDV-MCNC: 21 MG/DL (ref 7–20)
CALCIUM SERPL-MCNC: 8 MG/DL (ref 8.3–10.6)
CHLORIDE BLD-SCNC: 92 MMOL/L (ref 99–110)
CLARITY: CLEAR
CO2: 22 MMOL/L (ref 21–32)
COLOR: YELLOW
CREAT SERPL-MCNC: 1 MG/DL (ref 0.8–1.3)
EKG ATRIAL RATE: 81 BPM
EKG DIAGNOSIS: NORMAL
EKG P AXIS: 73 DEGREES
EKG P-R INTERVAL: 142 MS
EKG Q-T INTERVAL: 392 MS
EKG QRS DURATION: 82 MS
EKG QTC CALCULATION (BAZETT): 455 MS
EKG R AXIS: 38 DEGREES
EKG T AXIS: 51 DEGREES
EKG VENTRICULAR RATE: 81 BPM
EOSINOPHILS ABSOLUTE: 0 K/UL (ref 0–0.6)
EOSINOPHILS RELATIVE PERCENT: 0.6 %
EPITHELIAL CELLS, UA: ABNORMAL /HPF (ref 0–5)
GFR SERPL CREATININE-BSD FRML MDRD: >60 ML/MIN/{1.73_M2}
GLUCOSE BLD-MCNC: 126 MG/DL (ref 70–99)
GLUCOSE URINE: NEGATIVE MG/DL
HCT VFR BLD CALC: 16.7 % (ref 40.5–52.5)
HEMATOLOGY PATH CONSULT: NO
HEMOGLOBIN: 5.9 G/DL (ref 13.5–17.5)
KETONES, URINE: NEGATIVE MG/DL
LACTIC ACID, SEPSIS: 1.6 MMOL/L (ref 0.4–1.9)
LEUKOCYTE ESTERASE, URINE: NEGATIVE
LYMPHOCYTES ABSOLUTE: 0.3 K/UL (ref 1–5.1)
LYMPHOCYTES RELATIVE PERCENT: 39.6 %
MCH RBC QN AUTO: 39.4 PG (ref 26–34)
MCHC RBC AUTO-ENTMCNC: 35.2 G/DL (ref 31–36)
MCV RBC AUTO: 111.9 FL (ref 80–100)
MICROSCOPIC EXAMINATION: YES
MONOCYTES ABSOLUTE: 0.2 K/UL (ref 0–1.3)
MONOCYTES RELATIVE PERCENT: 22.1 %
NEUTROPHILS ABSOLUTE: 0.3 K/UL (ref 1.7–7.7)
NEUTROPHILS RELATIVE PERCENT: 35.2 %
NITRITE, URINE: NEGATIVE
PDW BLD-RTO: 18.4 % (ref 12.4–15.4)
PH UA: 6.5 (ref 5–8)
PLATELET # BLD: 38 K/UL (ref 135–450)
PMV BLD AUTO: 9.8 FL (ref 5–10.5)
POTASSIUM REFLEX MAGNESIUM: 4.3 MMOL/L (ref 3.5–5.1)
PROTEIN UA: NEGATIVE MG/DL
RAPID INFLUENZA  B AGN: NEGATIVE
RAPID INFLUENZA A AGN: NEGATIVE
RBC # BLD: 1.49 M/UL (ref 4.2–5.9)
RBC UA: ABNORMAL /HPF (ref 0–4)
SARS-COV-2, NAAT: NOT DETECTED
SCHISTOCYTES: ABNORMAL
SODIUM BLD-SCNC: 126 MMOL/L (ref 136–145)
SPECIFIC GRAVITY UA: <=1.005 (ref 1–1.03)
TOTAL PROTEIN: 5.5 G/DL (ref 6.4–8.2)
TRICHOMONAS: ABNORMAL /HPF
TROPONIN: <0.01 NG/ML
URINE REFLEX TO CULTURE: ABNORMAL
URINE TYPE: ABNORMAL
UROBILINOGEN, URINE: 0.2 E.U./DL
WBC # BLD: 0.8 K/UL (ref 4–11)
WBC UA: ABNORMAL /HPF (ref 0–5)
YEAST: PRESENT /HPF

## 2022-11-28 PROCEDURE — 99285 EMERGENCY DEPT VISIT HI MDM: CPT

## 2022-11-28 PROCEDURE — 83605 ASSAY OF LACTIC ACID: CPT

## 2022-11-28 PROCEDURE — 6360000004 HC RX CONTRAST MEDICATION: Performed by: EMERGENCY MEDICINE

## 2022-11-28 PROCEDURE — 6360000002 HC RX W HCPCS: Performed by: EMERGENCY MEDICINE

## 2022-11-28 PROCEDURE — 81001 URINALYSIS AUTO W/SCOPE: CPT

## 2022-11-28 PROCEDURE — 86850 RBC ANTIBODY SCREEN: CPT

## 2022-11-28 PROCEDURE — 96361 HYDRATE IV INFUSION ADD-ON: CPT

## 2022-11-28 PROCEDURE — 96366 THER/PROPH/DIAG IV INF ADDON: CPT

## 2022-11-28 PROCEDURE — P9016 RBC LEUKOCYTES REDUCED: HCPCS

## 2022-11-28 PROCEDURE — 2580000003 HC RX 258: Performed by: EMERGENCY MEDICINE

## 2022-11-28 PROCEDURE — 93005 ELECTROCARDIOGRAM TRACING: CPT | Performed by: EMERGENCY MEDICINE

## 2022-11-28 PROCEDURE — 86901 BLOOD TYPING SEROLOGIC RH(D): CPT

## 2022-11-28 PROCEDURE — 96365 THER/PROPH/DIAG IV INF INIT: CPT

## 2022-11-28 PROCEDURE — 87040 BLOOD CULTURE FOR BACTERIA: CPT

## 2022-11-28 PROCEDURE — 74177 CT ABD & PELVIS W/CONTRAST: CPT

## 2022-11-28 PROCEDURE — 87635 SARS-COV-2 COVID-19 AMP PRB: CPT

## 2022-11-28 PROCEDURE — 87804 INFLUENZA ASSAY W/OPTIC: CPT

## 2022-11-28 PROCEDURE — 93010 ELECTROCARDIOGRAM REPORT: CPT | Performed by: INTERNAL MEDICINE

## 2022-11-28 PROCEDURE — 36415 COLL VENOUS BLD VENIPUNCTURE: CPT

## 2022-11-28 PROCEDURE — 2060000000 HC ICU INTERMEDIATE R&B

## 2022-11-28 PROCEDURE — 96367 TX/PROPH/DG ADDL SEQ IV INF: CPT

## 2022-11-28 PROCEDURE — 72125 CT NECK SPINE W/O DYE: CPT

## 2022-11-28 PROCEDURE — 85025 COMPLETE CBC W/AUTO DIFF WBC: CPT

## 2022-11-28 PROCEDURE — 70450 CT HEAD/BRAIN W/O DYE: CPT

## 2022-11-28 PROCEDURE — 84484 ASSAY OF TROPONIN QUANT: CPT

## 2022-11-28 PROCEDURE — 86923 COMPATIBILITY TEST ELECTRIC: CPT

## 2022-11-28 PROCEDURE — 86900 BLOOD TYPING SEROLOGIC ABO: CPT

## 2022-11-28 PROCEDURE — 80053 COMPREHEN METABOLIC PANEL: CPT

## 2022-11-28 RX ORDER — ACETAMINOPHEN 325 MG/1
650 TABLET ORAL EVERY 6 HOURS PRN
Status: DISCONTINUED | OUTPATIENT
Start: 2022-11-28 | End: 2022-11-30 | Stop reason: HOSPADM

## 2022-11-28 RX ORDER — SODIUM CHLORIDE 9 MG/ML
INJECTION, SOLUTION INTRAVENOUS PRN
Status: DISCONTINUED | OUTPATIENT
Start: 2022-11-28 | End: 2022-11-29

## 2022-11-28 RX ORDER — ACETAMINOPHEN 650 MG/1
650 SUPPOSITORY RECTAL EVERY 6 HOURS PRN
Status: DISCONTINUED | OUTPATIENT
Start: 2022-11-28 | End: 2022-11-30 | Stop reason: HOSPADM

## 2022-11-28 RX ORDER — SODIUM CHLORIDE 9 MG/ML
INJECTION, SOLUTION INTRAVENOUS CONTINUOUS
Status: DISCONTINUED | OUTPATIENT
Start: 2022-11-28 | End: 2022-11-30 | Stop reason: HOSPADM

## 2022-11-28 RX ORDER — POLYETHYLENE GLYCOL 3350 17 G/17G
17 POWDER, FOR SOLUTION ORAL DAILY PRN
Status: DISCONTINUED | OUTPATIENT
Start: 2022-11-28 | End: 2022-11-30 | Stop reason: HOSPADM

## 2022-11-28 RX ORDER — SODIUM CHLORIDE 0.9 % (FLUSH) 0.9 %
5-40 SYRINGE (ML) INJECTION PRN
Status: DISCONTINUED | OUTPATIENT
Start: 2022-11-28 | End: 2022-11-30 | Stop reason: HOSPADM

## 2022-11-28 RX ORDER — SODIUM CHLORIDE 0.9 % (FLUSH) 0.9 %
5-40 SYRINGE (ML) INJECTION EVERY 12 HOURS SCHEDULED
Status: DISCONTINUED | OUTPATIENT
Start: 2022-11-28 | End: 2022-11-30 | Stop reason: HOSPADM

## 2022-11-28 RX ORDER — ONDANSETRON 2 MG/ML
4 INJECTION INTRAMUSCULAR; INTRAVENOUS EVERY 6 HOURS PRN
Status: DISCONTINUED | OUTPATIENT
Start: 2022-11-28 | End: 2022-11-30 | Stop reason: HOSPADM

## 2022-11-28 RX ORDER — ONDANSETRON 4 MG/1
4 TABLET, ORALLY DISINTEGRATING ORAL EVERY 8 HOURS PRN
Status: DISCONTINUED | OUTPATIENT
Start: 2022-11-28 | End: 2022-11-30 | Stop reason: HOSPADM

## 2022-11-28 RX ORDER — SODIUM CHLORIDE 9 MG/ML
INJECTION, SOLUTION INTRAVENOUS PRN
Status: DISCONTINUED | OUTPATIENT
Start: 2022-11-28 | End: 2022-11-30 | Stop reason: HOSPADM

## 2022-11-28 RX ORDER — SODIUM CHLORIDE 9 MG/ML
30 INJECTION, SOLUTION INTRAVENOUS ONCE
Status: COMPLETED | OUTPATIENT
Start: 2022-11-28 | End: 2022-11-28

## 2022-11-28 RX ADMIN — SODIUM CHLORIDE: 9 INJECTION, SOLUTION INTRAVENOUS at 13:00

## 2022-11-28 RX ADMIN — MEROPENEM: 1 INJECTION, POWDER, FOR SOLUTION INTRAVENOUS at 14:16

## 2022-11-28 RX ADMIN — SODIUM CHLORIDE 30 ML/KG/HR: 9 INJECTION, SOLUTION INTRAVENOUS at 13:42

## 2022-11-28 RX ADMIN — VANCOMYCIN HYDROCHLORIDE 1000 MG: 1 INJECTION, POWDER, LYOPHILIZED, FOR SOLUTION INTRAVENOUS at 14:19

## 2022-11-28 RX ADMIN — IOPAMIDOL 100 ML: 755 INJECTION, SOLUTION INTRAVENOUS at 13:55

## 2022-11-28 ASSESSMENT — PAIN SCALES - GENERAL: PAINLEVEL_OUTOF10: 0

## 2022-11-28 ASSESSMENT — PAIN - FUNCTIONAL ASSESSMENT: PAIN_FUNCTIONAL_ASSESSMENT: NONE - DENIES PAIN

## 2022-11-28 NOTE — ED NOTES
Access center called to get bed assignment    Informed center Dr Magdaleno Bundy had called Dr Alyx Suresh and accepted pt.      Marty Salazar, ANUSHA  11/28/22 [id number]

## 2022-11-28 NOTE — ED NOTES
TC from transfer center. Transport booked, [facility] [id number]. Primary RN updated.       Israel Koch RN  11/28/22 [id number]

## 2022-11-28 NOTE — ED NOTES
Family up dated with  assignment   And transfer time @2100     Zaria Hunt RN  11/28/22 1870 Susy Puentes RN  11/28/22 1827

## 2022-11-28 NOTE — ED PROVIDER NOTES
[address] ENCOUNTER      Pt Name: Laren Meckel  MRN: [id number]  [place] 11/12/1933  Date of evaluation: 11/28/2022  Provider: [facility]       Chief Complaint   Patient presents with    Fatigue     Pt been feeling weak past few days         HISTORY OF PRESENT ILLNESS   (Location/Symptom, Timing/Onset, Context/Setting, Quality, Duration, Modifying Factors, Severity)  Note limiting factors. Laren Meckel is a 80 y.o. male who presents to the emergency department with a complaint of feeling weak for the last few days. The patient recently was diagnosed with myelodysplastic syndrome and has been having dropping \"blood counts\" according to his wife. He had a bone marrow biopsy recently within the last couple of months. He has been receiving some \"shots\" but is not receiving any other therapy or treatment. He reports that he developed some cold symptoms approximately 1 week ago. He was prescribed an antibiotic, once daily which he took for couple of days and then developed 2 to 3 days of large-volume brown watery diarrhea. His wife discontinued the antibiotic because of the diarrhea. His wife states that he has gotten very weak over the last few days and 2 days ago he became so weak that his legs gave out while he was standing and he fell injuring his back. He sustained an abrasion to the left posterior flank and back area. She does not believe that he hit his head. There was no loss of consciousness. He denies any associated headache or neck pain. He denies any other injuries. Today, he was so weak that his wife called his [de-identified] office and was advised to bring him to the emergency department. Because he was too weak to walk, she called 911 and he was brought here. Patient denies any associated chest pain, heaviness, pressure, tightness, but he does report some shortness of breath and dyspnea on exertion.   His wife states that he looks very pale. He denies any headache, earache, sore throat or sinus drainage. No cough currently. No productive sputum. He denies any abdominal pain nausea or vomiting. He reports that the diarrhea has resolved over the last couple of days. Last episode of diarrhea was 2 days ago. He denies any melena or hematochezia. He does not take any anticoagulants. He does take aspirin 324 mg daily. He denies any dysuria hematuria frequency urgency. Nursing Notes were reviewed. HPI        REVIEW OF SYSTEMS    (2-9 systems for level 4, 10 or more for level 5)       Constitutional: Negative for fever or chills. HENT: Negative for rhinorrhea and sore throat. Eyes: Negative for redness or drainage. Cardiovascular: Negative for chest pain. Gastrointestinal: Negative for abdominal pain. Genitourinary: Negative for flank pain. Negative for dysuria. Negative for hematuria. Neurological: Negative for headache. Musculoskeletal:  Negative edema. Hematological: Negative for adenopathy. All systems are reviewed and are negative except for those listed above in the history of present illness and ROS.         PAST MEDICAL HISTORY     Past Medical History:   Diagnosis Date    Collapsed lung     H/O    Hyperlipidemia     Hypertension     Leg fracture, right     H/O    Post-herpetic polyneuropathy     Prostate cancer [facility])          SURGICAL HISTORY       Past Surgical History:   Procedure Laterality Date    APPENDECTOMY      COLONOSCOPY  11/03/2020    COLONOSCOPY N/A 11/3/2020    COLONOSCOPY with ABLATION performed by Mahad Ying MD at [address] [place] [address]  11/3/2020    COLONOSCOPY WITH BIOPSY performed by Mahad Ying MD at [address] [place] [address] N/A 5/25/2021    COLONOSCOPY POLYPECTOMY SNARE/COLD BIOPSY performed by Mahad Ying MD at [address] [place] [address]       Discharge Medication List as of 11/30/2022  8:27 AM CONTINUE these medications which have NOT CHANGED    Details   escitalopram (LEXAPRO) 5 MG tablet Take 5 mg by mouth dailyHistorical Med      HYDROcodone-acetaminophen (NORCO) 5-325 MG per tablet Take 1 tablet by mouth 2 times daily as needed for Pain for up to 30 days. , Disp-60 tablet, R-0Normal      LORazepam (ATIVAN) 0.5 MG tablet Take 0.5 mg by mouth nightly as needed for Anxiety (sleep/anxiety). Historical Med      tamsulosin (FLOMAX) 0.4 MG capsule Take 1 capsule by mouth daily, Disp-90 capsule, R-2Normal      fluticasone (FLONASE) 50 MCG/ACT nasal spray 2 sprays by Each Nostril route daily, Disp-16 g, R-5Normal      aspirin 81 MG EC tablet Take 81 mg by mouth dailyHistorical Med      polyethylene glycol (GLYCOLAX) packet Take 17 g by mouth dailyHistorical Med      omeprazole (PRILOSEC) 20 MG capsule Take 20 mg by mouth DailyHistorical Med             ALLERGIES     Cephalosporins and Cefadroxil    FAMILY HISTORY       Family History   Problem Relation Age of Onset    No Known Problems Mother     No Known Problems Father           SOCIAL HISTORY       Social History     Socioeconomic History    Marital status:      Spouse name: None    Number of children: None    Years of education: None    Highest education level: None   Tobacco Use    Smoking status: Former    Smokeless tobacco: Never   Vaping Use    Vaping Use: Never used   Substance and Sexual Activity    Alcohol use: No    Drug use: No    Sexual activity: Not Currently     Social Determinants of Health     Financial Resource Strain: Low Risk     Difficulty of Paying Living Expenses: Not hard at all   Food Insecurity: No Food Insecurity    Worried About Running Out of Food in the Last Year: Never true    Ran Out of Food in the Last Year: Never true   Physical Activity: Insufficiently Active    Days of Exercise per Week: 3 days    Minutes of Exercise per Session: 20 min       SCREENINGS    [place] Coma Scale  Eye Opening: Spontaneous  Best Verbal Response: Oriented  Best Motor Response: Obeys commands  [place] Coma Scale Score: 15        PHYSICAL EXAM    (up to 7 for level 4, 8 or more for level 5)     ED Triage Vitals [11/28/22 1202]   BP Temp Temp Source Heart Rate Resp SpO2 Height Weight   (!) 116/52 97.8 °F (36.6 °C) Oral 93 18 93 % 6' (1.829 m) 149 lb 14.6 oz (68 kg)         Physical Exam   Constitutional: Awake and alert. Chronically ill-appearing. Very pale. Head: No visible evidence of trauma. Normocephalic. Eyes: Pupils equal and reactive. No photophobia. Conjunctiva normal.    HENT: Oral mucosa moist.  Airway patent. Pharynx without erythema. Nares were clear. No intraoral or intranasal injury. Neck:  Soft and supple. Nontender. No point or axial tenderness but there was some discomfort with range of motion. Heart:  Regular rate and rhythm. No murmur. Lungs:  Clear to auscultation. No wheezes, rales, or ronchi. No conversational dyspnea or accessory muscle use. Chest: Chest wall non-tender. No evidence of trauma. Abdomen:  Soft, nondistended, bowel sounds present. Nontender. No guarding rigidity or rebound. No masses. No CVA tenderness. Rectal exam was completed. No gross blood. No melena. Stool is medium brown in color. Nontender. Musculoskeletal: Pelvis is stable and nontender. Thoracic and lumbar spine were nontender. No point tenderness or step-off. Superficial abrasions noted in the left posterior scapular area at the inferior margin of the scapula. No crepitance deformity or step-off. Extremities non-tender with full range of motion. Radial and dorsalis pedis pulses were intact. No calf tenderness erythema or edema. Neurological: Alert and oriented x 3. Speech clear. No dysarthria. No aphasia. No pronator drift. Able to lift all extremities off the bed without difficulty. Cranial nerves II-XII intact. No facial droop. No acute focal motor or sensory deficits. Skin: Skin is warm and dry. No rash. Lymphatic:  No lympadenopathy. Psychiatric: Normal mood and affect. Behavior is normal.         DIAGNOSTIC RESULTS     EKG: All EKG's are interpreted by the Emergency Department Physician who either signs or Co-signs this chart in the absence of a cardiologist.    Normal sinus rhythm. Rate 81. NH interval 142 ms. QRS duration 82 ms. QTc 455 ms. R axis 38 degrees. Baseline artifact was noted. No ST elevation. EKG is unchanged from November 1, 2022. RADIOLOGY:   Non-plain film images such as CT, Ultrasound and MRI are read by the radiologist. Plain radiographic images are visualized and preliminarily interpreted by the emergency physician with the below findings:        Interpretation per the Radiologist below, if available at the time of this note:    CT CHEST ABDOMEN PELVIS W CONTRAST Additional Contrast? None   Final Result   1. CT CHEST: No acute traumatic abnormality. 2. Nonspecific mild right hilar lymph node enlargement is likely reactive. Consider IV contrast-enhanced chest CT at 6 months to ensure stability. 3. No acute pulmonary disease. 4. Mild-to-moderate calcific atherosclerosis coronary arteries. 5. CT ABDOMEN/PELVIS: No acute traumatic abnormality. 6. Nonspecific natalie hepatis lymph node without other adenopathy in the   abdomen/pelvis is very likely reactive. Consider follow-up with IV   contrast-enhanced CT abdomen at 6 months to ensure stability. 7. Mild diverticulosis coli without CT evidence of acute diverticulitis. 8. No other CT evidence of an acute intra-abdominal or intrapelvic process. 9.  Vascular calcifications are noted reflecting calcific atherosclerosis. CT CERVICAL SPINE WO CONTRAST   Final Result   1. No acute cervical fracture or listhesis. 2. Moderate severity degenerative disc disease and spondylosis throughout the   cervical spine.    3. Mild-to-moderate acquired cervical spinal canal stenosis C3-4, C4-5 and   C5-6.   4. Bilateral neural foraminal narrowing mild-to-moderate severity   predominates on the right at C6-7. CT HEAD WO CONTRAST   Final Result   No acute intracranial abnormality. ED BEDSIDE ULTRASOUND:   Performed by ED Physician - none    LABS:  Labs Reviewed   CBC WITH AUTO DIFFERENTIAL - Abnormal; Notable for the following components:       Result Value    WBC 0.8 (*)     RBC 1.49 (*)     Hemoglobin 5.9 (*)     Hematocrit 16.7 (*)     .9 (*)     MCH 39.4 (*)     RDW 18.4 (*)     Platelets 38 (*)     Neutrophils Absolute 0.3 (*)     Lymphocytes Absolute 0.3 (*)     Schistocytes 1+ (*)     All other components within normal limits    Narrative:     CALL  [place]  [facility] tel. [phone],  Hematology results called to and read back by Karen Ho, 11/28/2022  13:08, by Chris Beltran  Hematology results called to and read back by Karen Barreto RN, 11/28/2022  12:48, by BRY   COMPREHENSIVE METABOLIC PANEL W/ REFLEX TO MG FOR LOW K - Abnormal; Notable for the following components:    Sodium 126 (*)     Chloride 92 (*)     Glucose 126 (*)     BUN 21 (*)     Calcium 8.0 (*)     Total Protein 5.5 (*)     Albumin 2.7 (*)     Albumin/Globulin Ratio 1.0 (*)     All other components within normal limits   URINALYSIS WITH REFLEX TO CULTURE - Abnormal; Notable for the following components:    Blood, Urine TRACE-INTACT (*)     All other components within normal limits   MICROSCOPIC URINALYSIS - Abnormal; Notable for the following components:    Yeast, UA Present (*)     All other components within normal limits   COVID-19, RAPID   RAPID INFLUENZA A/B ANTIGENS   CULTURE, BLOOD 1    Narrative:     ORDER#: [id number]                          ORDERED BY: TYE BEASLEY  SOURCE: Blood                              COLLECTED:  11/28/22 23:16  ANTIBIOTICS AT KEN.:                      RECEIVED :  11/28/22 23:20  If child <=2 yrs old please draw pediatric bottle. ~Blood Culture 1   CULTURE, BLOOD 2    Narrative:     ORDER#: [id number] ORDERED BY: TYE BEASLEY  SOURCE: Blood No site given                COLLECTED:  11/28/22 13:50  ANTIBIOTICS AT KEN.:                      RECEIVED :  11/28/22 19:26  If child <=2 yrs old please draw pediatric bottle. ~Blood Culture #2   TROPONIN   LACTATE, SEPSIS   TYPE AND SCREEN   PREPARE RBC (CROSSMATCH)       All other labs were within normal range or not returned as of this dictation. EMERGENCY DEPARTMENT COURSE and DIFFERENTIAL DIAGNOSIS/MDM:   Vitals:    Vitals:    11/29/22 0815 11/29/22 1958 11/30/22 0401 11/30/22 0730   BP: 132/62 (!) 126/105 136/64 114/62   Pulse: 85 80 78 77   Resp: 18 18 18 14   Temp: 97.5 °F (36.4 °C) 98.3 °F (36.8 °C) 98.2 °F (36.8 °C) 97.9 °F (36.6 °C)   TempSrc: Oral Oral Oral Oral   SpO2: 95% 94% 94% 93%   Weight:   151 lb 0.2 oz (68.5 kg)    Height:             MDM        The patient presents with history of some upper respiratory flulike symptoms several days ago followed by 2 days of large-volume brown watery diarrhea, followed by general weakness. He did have 1 fall 2 days ago but has not fallen since that time. Patient is awake and alert. GCS is 15. He is neurologically intact. His abdomen is nontender. At the time of my examination blood pressure is 90/62. Heart rate is in the 80s. Rectal exam reveals no gross blood and no melena. Given his recent fall, CT head without contrast and CT cervical spine were obtained as well as CT chest abdomen and pelvis. Patient is known to have a history of low platelets. He is at risk for intracranial hemorrhage. He was given normal saline 30 mL/kg. Given his hypotension and history of neutropenia, he was given cefepime 2 g IV and vancomycin 1 g IV. 2D echocardiogram from November 2, 2022 was reviewed. Normal left ventricle size, wall thickness, and systolic function with an estimated ejection fraction of 60-65%. No regional wall motion  abnormalities are seen. Indeterminate diastolic function.   Mild mitral annular calcification. No mitral stenosis. Trace mitral regurgitation. Trace to mild aortic regurgitation. The right ventricle is normal in size and function. TAPSE is 2.07 cm. Is this patient to be included in the SEP-1 Core Measure due to severe sepsis or septic shock? Yes   SEP-1 CORE MEASURE DATA      Sepsis Criteria   Severe Sepsis Criteria   Septic Shock Criteria     Must be confirmed or suspected to move forward with diagnosis of sepsis. Must meet 2:    [] Temperature > 100.9 F (38.3 C)        or < 96.8 F (36 C)  [] HR > 90  [] RR > 20  [] WBC > 12 or < 4 or 10% bands      AND:      [] Infection Confirmed or        Suspected. Must meet 1:    [] Lactate > 2       or   [] Signs of Organ Dysfunction:    - SBP < 90 or MAP < 65  - Altered mental status  - Creatinine > 2 or increased from      baseline  - Urine Output < 0.5 ml/kg/hr  - Bilirubin > 2  - INR > 1.5 (not anticoagulated)  - Platelets < 890,908  - Acute Respiratory Failure as     evidenced by new need for NIPPV     or mechanical ventilation      [] No criteria met for Severe Sepsis. Must meet 1:    [] Lactate > 4        or   [x] SBP < 90 or MAP < 65 for at        least two readings in the first        hour after fluid bolus        administration      [] Vasopressors initiated (if hypotension persists after fluid resuscitation)        [] No criteria met for Septic Shock. No data found. No results for input(s): WBC, LACTA, CREATININE, BILITOT, INR, PLT in the last 72 hours. Time Septic Shock Identified: 1:20 PM    Fluid Resuscitation Rational: at least 30mL/kg based on entered actual weight at time of triage    Repeat lactate level: ordered and pending at this time    Reassessment Exam:   I have reassessed tissue perfusion and hemodynamic status after fluid bolus at this time: Patient is no longer hypotensive. Blood pressure is 108/52. He is awake and alert. Much improved.       REASSESSMENT          1:27 PM: White blood cell count is 0.8. Hemoglobin 5.9. Platelet count is 38. Absolute neutrophil count is 0.3. The patient has had decreased oral intake over the last few days with 2 days of very heavy volume watery brown diarrhea. I suspect that he is volume depleted. However, he is also significantly anemic and will require transfusion of packed red blood cells. Blood transfusion is not available at this facility. A call was placed to the hospitalist on-call at [facility] to facilitate admission to the ICU.    2:50 PM: I spoke with Dr. Juvenal Roland, the patient's primary care physician who will admit the patient. He will place admission orders. The patient will be transferred by [address] ambulance. He will be transfused after arrival at [address] [place] [address] Ave time was 35 minutes, excluding separately reportable procedures. There was a high probability of clinically significant/life threatening deterioration in the patient's condition which required my urgent intervention. CONSULTS:  IP CONSULT TO ONCOLOGY  IP CONSULT TO HOSPICE    PROCEDURES:  Unless otherwise noted below, none     Procedures        FINAL IMPRESSION      1. Pancytopenia ([address]    2. Myelodysplastic syndrome ([address]    3. Other specified hypotension    4. Dehydration    5. Diarrhea, unspecified type          DISPOSITION/PLAN   DISPOSITION Decision To Admit 11/28/2022 03:14:40 PM      PATIENT REFERRED TO:  [facility] [address]  [phone]          DISCHARGE MEDICATIONS:  Discharge Medication List as of 11/30/2022  8:27 AM        Controlled Substances Monitoring:     RX Monitoring 11/18/2022   Periodic Controlled Substance Monitoring Possible medication side effects, risk of tolerance/dependence & alternative treatments discussed. Chronic Pain > 50 MEDD Re-evaluated the status of the patient's underlying condition causing pain.        (Please note that portions of this note were completed with a voice recognition program.  Efforts were made to edit the dictations but occasionally words are mis-transcribed. )    Merlene Simon DO (electronically signed)  Attending Emergency Physician           Tom Hill DO  12/01/22 8715

## 2022-11-29 LAB
BLOOD BANK DISPENSE STATUS: NORMAL
BLOOD BANK DISPENSE STATUS: NORMAL
BLOOD BANK PRODUCT CODE: NORMAL
BLOOD BANK PRODUCT CODE: NORMAL
BPU ID: NORMAL
BPU ID: NORMAL
DESCRIPTION BLOOD BANK: NORMAL
DESCRIPTION BLOOD BANK: NORMAL

## 2022-11-29 PROCEDURE — 99222 1ST HOSP IP/OBS MODERATE 55: CPT | Performed by: STUDENT IN AN ORGANIZED HEALTH CARE EDUCATION/TRAINING PROGRAM

## 2022-11-29 PROCEDURE — 2060000000 HC ICU INTERMEDIATE R&B

## 2022-11-29 PROCEDURE — 2580000003 HC RX 258: Performed by: STUDENT IN AN ORGANIZED HEALTH CARE EDUCATION/TRAINING PROGRAM

## 2022-11-29 PROCEDURE — 94760 N-INVAS EAR/PLS OXIMETRY 1: CPT

## 2022-11-29 PROCEDURE — 36430 TRANSFUSION BLD/BLD COMPNT: CPT

## 2022-11-29 PROCEDURE — 6370000000 HC RX 637 (ALT 250 FOR IP): Performed by: STUDENT IN AN ORGANIZED HEALTH CARE EDUCATION/TRAINING PROGRAM

## 2022-11-29 RX ORDER — ROSUVASTATIN CALCIUM 20 MG/1
20 TABLET, COATED ORAL NIGHTLY
Status: DISCONTINUED | OUTPATIENT
Start: 2022-11-29 | End: 2022-11-29

## 2022-11-29 RX ORDER — LORAZEPAM 0.5 MG/1
0.5 TABLET ORAL NIGHTLY PRN
Status: DISCONTINUED | OUTPATIENT
Start: 2022-11-29 | End: 2022-11-30 | Stop reason: HOSPADM

## 2022-11-29 RX ORDER — PANTOPRAZOLE SODIUM 40 MG/1
40 TABLET, DELAYED RELEASE ORAL
Status: DISCONTINUED | OUTPATIENT
Start: 2022-11-29 | End: 2022-11-30 | Stop reason: HOSPADM

## 2022-11-29 RX ORDER — TAMSULOSIN HYDROCHLORIDE 0.4 MG/1
0.4 CAPSULE ORAL DAILY
Status: DISCONTINUED | OUTPATIENT
Start: 2022-11-29 | End: 2022-11-30 | Stop reason: HOSPADM

## 2022-11-29 RX ORDER — ESCITALOPRAM OXALATE 10 MG/1
5 TABLET ORAL DAILY
Status: DISCONTINUED | OUTPATIENT
Start: 2022-11-29 | End: 2022-11-30 | Stop reason: HOSPADM

## 2022-11-29 RX ORDER — HYDROCODONE BITARTRATE AND ACETAMINOPHEN 5; 325 MG/1; MG/1
1 TABLET ORAL 2 TIMES DAILY PRN
Status: DISCONTINUED | OUTPATIENT
Start: 2022-11-29 | End: 2022-11-30 | Stop reason: HOSPADM

## 2022-11-29 RX ADMIN — PANTOPRAZOLE SODIUM 40 MG: 40 TABLET, DELAYED RELEASE ORAL at 09:15

## 2022-11-29 RX ADMIN — TAMSULOSIN HYDROCHLORIDE 0.4 MG: 0.4 CAPSULE ORAL at 09:16

## 2022-11-29 RX ADMIN — ESCITALOPRAM OXALATE 5 MG: 10 TABLET ORAL at 09:15

## 2022-11-29 RX ADMIN — Medication 10 ML: at 20:07

## 2022-11-29 RX ADMIN — LORAZEPAM 0.5 MG: 0.5 TABLET ORAL at 20:06

## 2022-11-29 ASSESSMENT — PAIN SCALES - GENERAL
PAINLEVEL_OUTOF10: 0
PAINLEVEL_OUTOF10: 0

## 2022-11-29 NOTE — CARE COORDINATION
Case Management Assessment  Initial Evaluation    Date/Time of Evaluation: 11/29/2022 11:47 AM  Assessment Completed by: Wendy Son RN    If patient is discharged prior to next notation, then this note serves as note for discharge by case management. Patient Name: Gwyn Jeffrey                   YOB: 1933  Diagnosis: Other specified hypotension [I95.89]  Dehydration [E86.0]  Myelodysplastic syndrome ([facility] [D46.9]  Pancytopenia ([facility] [D61.818]  Diarrhea, unspecified type [R19.7]                   Date / Time: 11/28/2022 11:51 AM    Patient Admission Status: Inpatient   Readmission Risk (Low < 19, Mod (19-27), High > 27): Readmission Risk Score: 17.6    Current PCP: Jessy Mitchell MD  PCP verified by CM? Yes    Chart Reviewed: Yes      History Provided by: Patient, Significant Other  Patient Orientation: Alert and Oriented, Person, Place    Patient Cognition: Dementia / Early Alzheimer's    Hospitalization in the last 30 days (Readmission):  Yes    If yes, Readmission Assessment in  Navigator will be completed.     Advance Directives:      Code Status: DNR-CC   Patient's Primary Decision Maker is: Legal Next of Kin    Primary Decision Maker: RaffaeleSandra - Spouse - [phone]    Secondary Decision Maker: Mathew Yap - Child - [phone]    Discharge Planning:    Patient lives with: Spouse/Significant Other Type of Home: House  Primary Care Giver: Family  Patient Support Systems include: Spouse/Significant Other, Family Members   Current Financial resources: Medicare  Current community resources: ECF/Home Care  Current services prior to admission: Home Care            Current DME:  cane, walker            Type of Home Care services:  Hospice    ADLS  Prior functional level: Assistance with the following:, Bathing, Dressing, Toileting, Cooking, Mobility  Current functional level: Assistance with the following:, Bathing, Dressing, Toileting, Cooking, Mobility    Hospice referral in place    Family can provide assistance at DC: Yes  Would you like Case Management to discuss the discharge plan with any other family members/significant others, and if so, who? Yes (wife, Coy Welsh)  Plans to Return to Present Housing: Yes  Other Identified Issues/Barriers to RETURNING to current housing: N/A  Potential Assistance needed at discharge: Other (Comment) (Hospice)            Potential DME:  pending  Patient expects to discharge to: [address] [place] [address] for transportation at discharge: Family    Financial    Payor: Pete Michel / Plan: MEDICARE PART A AND B / Product Type: *No Product type* /     Does insurance require precert for SNF: No    Potential assistance Purchasing Medications: No  Meds-to-Beds request: Yes      [facility] [id number] [facility] [address], [address] [facility]. Shankar Guan [phone] - F [phone]  [address] [place] [address]. [place] [id number]  Phone: [phone] Fax: [phone]      Notes:    Factors facilitating achievement of predicted outcomes: Family support, Pleasant, and Has needed Durable Medical Equipment at home    Barriers to discharge: Decreased endurance and Lower extremity weakness    Additional Case Management Notes: Patient plans to return home with family. Hospice referral sent to [facility]. The Plan for Transition of Care is related to the following treatment goals of Other specified hypotension [I95.89]  Dehydration [E86.0]  Myelodysplastic syndrome ([address] [D46.9]  Pancytopenia ([address] [D61.818]  Diarrhea, unspecified type [T67.0]    IF APPLICABLE: The Patient and/or patient representative Shraddha Callahan and his family were provided with a choice of provider and agrees with the discharge plan.  Freedom of choice list with basic dialogue that supports the patient's individualized plan of care/goals and shares the quality data associated with the providers was provided to: Patient, Patient Representative   Patient Representative Name: Felicia Welch     The Patient and/or Patient Representative Agree with the Discharge Plan?  Yes    Attila Brooks RN  Case Management Department  Ph: [phone]

## 2022-11-29 NOTE — PROGRESS NOTES
4 Eyes Skin Assessment     NAME:  Brian Pollard  YOB: 1933  MEDICAL RECORD NUMBER:  [id number]    The patient is being assessed for  Admission    I agree that One RN have performed a thorough Head to Toe Skin Assessment on the patient. ALL assessment sites listed below have been assessed. Areas assessed by both nurses:    Head, Face, Ears, Shoulders, Back, Chest, Arms, Elbows, Hands, Sacrum. Buttock, Coccyx, Ischium, and Legs. Feet and Heels        Does the Patient have a Wound?  No noted wound(s)       Harry Prevention initiated by RN: No   Wound Care Orders initiated by RN: No    Pressure Injury (Stage 3,4, Unstageable, DTI, NWPT, and Complex wounds) if present place referral order by RN under : No    New and Established Ostomies, if present place, referral order under : No      Nurse 1 eSignature: Electronically signed by Elen Moore RN on 11/28/22 at 10:51 PM EST    **SHARE this note so that the co-signing nurse is able to place an eSignature**    Nurse 2 eSignature: Electronically signed by Rivera Bates RN on 11/28/22 at 11:35 PM EST

## 2022-11-29 NOTE — H&P
[address] [facility] Internal Medicine  History and Physical    Patient's PCP: Galilea Garces MD  Code Status: Full Code  History Obtained From:  patient    CC: weakness     HPI:     Froylan Aguilar is an 26-year-old man with a past medical history significant for myelodysplastic disorder, hypertension, prostate cancer and frequent PVCs who presented to the emergency department yesterday after progressive weakness in his legs. Patient reports progressive weakness in his lower legs over the past few days. He was also recently on antibiotic for cough and cold symptoms. He has had a few falls without any head trauma in the past few days. He does not report any nausea, vomiting or diarrhea. No chest pain or shortness of breath. No fever or chills at home. His appetite has been generally poor. Past Medical / Surgical History:    Past Medical History:   Diagnosis Date    Collapsed lung     H/O    Hyperlipidemia     Hypertension     Leg fracture, right     H/O    Post-herpetic polyneuropathy     Prostate cancer [facility])      Past Surgical History:   Procedure Laterality Date    APPENDECTOMY      COLONOSCOPY  11/03/2020    COLONOSCOPY N/A 11/3/2020    COLONOSCOPY with ABLATION performed by Roxann Garcias MD at [address] [place] [address]  11/3/2020    COLONOSCOPY WITH BIOPSY performed by Roxann Garcias MD at [address] [place] [address] N/A 5/25/2021    COLONOSCOPY POLYPECTOMY SNARE/COLD BIOPSY performed by Roxann Garcias MD at [address]         Medications Prior to Admission:    No current facility-administered medications on file prior to encounter. Current Outpatient Medications on File Prior to Encounter   Medication Sig Dispense Refill    escitalopram (LEXAPRO) 5 MG tablet Take 5 mg by mouth daily      HYDROcodone-acetaminophen (NORCO) 5-325 MG per tablet Take 1 tablet by mouth 2 times daily as needed for Pain for up to 30 days.  60 tablet 0    LORazepam (ATIVAN) 0.5 MG tablet Take 0.5 mg by mouth nightly as needed for Anxiety (sleep/anxiety). rosuvastatin (CRESTOR) 10 MG tablet TAKE ONE TABLET BY MOUTH EVERY NIGHT AT BEDTIME 90 tablet 1    tamsulosin (FLOMAX) 0.4 MG capsule Take 1 capsule by mouth daily 90 capsule 2    fluticasone (FLONASE) 50 MCG/ACT nasal spray 2 sprays by Each Nostril route daily 16 g 5    aspirin 81 MG EC tablet Take 81 mg by mouth daily      polyethylene glycol (GLYCOLAX) packet Take 17 g by mouth daily      omeprazole (PRILOSEC) 20 MG capsule Take 20 mg by mouth Daily         Allergies:  Cephalosporins and Cefadroxil    Social History:   TOBACCO:   reports that he has quit smoking. He has never used smokeless tobacco.     ETOH:   reports no history of alcohol use. Family History:       Problem Relation Age of Onset    No Known Problems Mother     No Known Problems Father        ROS:   All other systems reviewed and are negative. PHYSICAL EXAM:  /63   Pulse 80   Temp 98.4 °F (36.9 °C)   Resp 18   Ht 6' 1\" (1.854 m)   Wt 160 lb 11.5 oz (72.9 kg)   SpO2 95%   BMI 21.20 kg/m²     Physical Exam  Vitals reviewed. Constitutional:       General: He is not in acute distress. Appearance: Normal appearance. HENT:      Head: Normocephalic. Dry oral mucosa  Eyes:      Extraocular Movements: Extraocular movements intact. Conjunctiva/sclera: Conjunctivae normal.   Cardiovascular:      Heart sounds: Regular rate and rhythm, no murmurs poor skin turgor  Pulmonary:      Effort: Pulmonary effort is normal.   Musculoskeletal:      Right lower leg: No edema. Left lower leg: No edema. Skin:     General: Skin is warm and dry. Neurological:      General: No focal deficit present. Mental Status: He is alert and oriented to person, place, and time. Mental status is at baseline.    Psychiatric:         Mood and Affect: Mood normal.     LABS:  Recent Labs     11/28/22  1210   WBC 0.8*   HGB 5.9*   HCT 16.7*   PLT 38* Recent Labs     11/28/22  1210   *   K 4.3   CL 92*   CO2 22   BUN 21*   CREATININE 1.0   GLUCOSE 126*     Recent Labs     11/28/22  1210   AST 34   ALT 30   BILITOT 0.8   ALKPHOS 64     Recent Labs     11/28/22  1210   TROPONINI <0.01     No results for input(s): BNP in the last 72 hours. No results found for: PHART, UFI4AHU, PO2ART  No results for input(s): INR in the last 72 hours. Recent Labs     11/28/22  1525   COLORU Yellow   PHUR 6.5   WBCUA 0-2   RBCUA 3-4   TRICHOMONAS None Seen   YEAST Present*   CLARITYU Clear   SPECGRAV <=1.005   LEUKOCYTESUR Negative   UROBILINOGEN 0.2   BILIRUBINUR Negative   BLOODU TRACE-INTACT*   GLUCOSEU Negative         U/A:    Lab Results   Component Value Date/Time    NITRITE N 09/25/2015 12:16 PM    COLORU Yellow 11/28/2022 03:25 PM    WBCUA 0-2 11/28/2022 03:25 PM    RBCUA 3-4 11/28/2022 03:25 PM    CLARITYU Clear 11/28/2022 03:25 PM    [place] Gut <=1.005 11/28/2022 03:25 PM    LEUKOCYTESUR Negative 11/28/2022 03:25 PM    BLOODU TRACE-INTACT 11/28/2022 03:25 PM    GLUCOSEU Negative 11/28/2022 03:25 PM     CT head: No acute intracranial abnormality    CT cervical spine: Bilateral neural foraminal narrowing at C6-C7, C3-4, C4-5 and C5-6 stenosis    CT chest abdomen pelvis with contrast    Impression   1. CT CHEST: No acute traumatic abnormality. 2. Nonspecific mild right hilar lymph node enlargement is likely reactive. Consider IV contrast-enhanced chest CT at 6 months to ensure stability. 3. No acute pulmonary disease. 4. Mild-to-moderate calcific atherosclerosis coronary arteries. 5. CT ABDOMEN/PELVIS: No acute traumatic abnormality. 6. Nonspecific natalie hepatis lymph node without other adenopathy in the   abdomen/pelvis is very likely reactive. Consider follow-up with IV   contrast-enhanced CT abdomen at 6 months to ensure stability. 7. Mild diverticulosis coli without CT evidence of acute diverticulitis.    8. No other CT evidence of an acute intra-abdominal or intrapelvic process. 9.  Vascular calcifications are noted reflecting calcific atherosclerosis. EKG:  Clinical Impression: Sinus rhythm, PACs    Assessment & Plan:    Jory Pollard is a 80 y.o. male who was admitted with Pancytopenia ([facility] [address] Patient admitted with progressive pancytopenia in the setting of MDS. He was transfused 2 units overnight and had does have some symptomatic improvement. He has progressive disease and does not elect to pursue chemotherapy at this time. Patient and wife discussed this with oncology this morning. He did receive antibiotics in the emergency department yesterday it is but does not have any fevers or sign of infection right now. I think most of his symptoms are from symptomatic anemia. He does not have any active bleeding necessitating platelet transfusion. Will continue medications for symptom control including Norco and benzodiazepine. Can also continue PPI. Await hospice consult as patient wishes to avoid invasive care which I think is reasonable given his age and severity of disease. Dispo: Can discharge to home hospice when available    Jerel Carmona MD   11/29/2022 8:09 AM    Documentation was done using voice recognition dragon software. Every effort was made to ensure accuracy; however, inadvertent unintentional computerized transcription errors may be present.

## 2022-11-29 NOTE — PROGRESS NOTES
HOSPICE [facility]  Met with patient's wife in hospital room. Family is agreeable to hospice care. Equipment to be delivered tonight. Patient to discharge tomorrow to home with hospice care in place. Update- transport scheduled for tomorrow at 12 noon with [employer] transporting the patient back home. DNR-CC placed on manilla folder in patient's chart on the unit for MD signature.      [address]  [phone] (work cell)  [phone] (main and referrals)

## 2022-11-29 NOTE — CARE COORDINATION
11/29/22 0858   Readmission Assessment   Number of Days since last admission? 8-30 days   Previous Disposition Home with Home Health   Who is being Interviewed Patient;Caregiver   What was the patient's/caregiver's perception as to why they think they needed to return back to the hospital? Other (Comment)  (Hemoglobin 5.9, pancytopenia)   Did you visit your Primary Care Physician after you left the hospital, before you returned this time? Yes   Did you see a specialist, such as Cardiac, Pulmonary, Orthopedic Physician, etc. after you left the hospital? Yes   Who advised the patient to return to the hospital? Physician   Does the patient report anything that got in the way of taking their medications?  No     #[phone]  Electronically signed by Ida Halsted, RN on 11/29/2022 at 9:00 AM

## 2022-11-29 NOTE — PROGRESS NOTES
[address]  Call to patient's wife, no answer. Call to patient's son to set up meeting. Son is in class right now and states he will call me back after he is done with his class to set up a meeting time.      [address] Carol Lindo  [phone] (work cell)  [phone] (main and referrals)

## 2022-11-29 NOTE — PLAN OF CARE
Problem: Safety - Adult  Goal: Free from fall injury  Outcome: Progressing  Flowsheets (Taken 11/29/2022 0047)  Free From Fall Injury: Instruct family/caregiver on patient safety     Problem: Respiratory - Adult  Goal: Achieves optimal ventilation and oxygenation  Outcome: Progressing  Flowsheets  Taken 11/29/2022 0047  Achieves optimal ventilation and oxygenation:   Assess for changes in respiratory status   Assess for changes in mentation and behavior  Taken 11/28/2022 2241  Achieves optimal ventilation and oxygenation: Assess for changes in respiratory status     Problem: Metabolic/Fluid and Electrolytes - Adult  Goal: Electrolytes maintained within normal limits  Outcome: Progressing  Flowsheets  Taken 11/29/2022 0047  Electrolytes maintained within normal limits:   Monitor labs and assess patient for signs and symptoms of electrolyte imbalances   Administer electrolyte replacement as ordered  Taken 11/28/2022 2241  Electrolytes maintained within normal limits: Monitor labs and assess patient for signs and symptoms of electrolyte imbalances     Problem: Hematologic - Adult  Goal: Maintains hematologic stability  Outcome: Progressing  Flowsheets (Taken 11/29/2022 0047)  Maintains hematologic stability:   Assess for signs and symptoms of bleeding or hemorrhage   Administer blood products/factors as ordered

## 2022-11-29 NOTE — CONSULTS
[facility]    Consult Note      Requesting Physician: Zoya   CHIEF COMPLAINT: fatige       HISTORY OF PRESENT ILLNESS:      Mr. Basilio Luevano  is a 80 y.o. male we are seeing in consultation for \ high riks mds and pancytopenia  This is a new diagnosis in the past few months  Optison at diagnosis were discussed and he opted for supportive transfusions procrit neuopogen but did nto desire chemo ie vidaza to slow this down  He is in for acute on chronic anemia and weakness/symptomatic  We had a long discussion about goal sof care       Past Medical History:        Diagnosis Date    Collapsed lung     H/O    Hyperlipidemia     Hypertension     Leg fracture, right     H/O    Post-herpetic polyneuropathy     Prostate cancer [facility])      Past Surgical History:        Procedure Laterality Date    APPENDECTOMY      COLONOSCOPY  11/03/2020    COLONOSCOPY N/A 11/3/2020    COLONOSCOPY with ABLATION performed by Pradip Munoz MD at [address] [facility]  11/3/2020    COLONOSCOPY WITH BIOPSY performed by Pradip Munoz MD at [address] [facility] N/A 5/25/2021    COLONOSCOPY POLYPECTOMY SNARE/COLD BIOPSY performed by Pradip Munoz MD at [address]         Current Medications:    No current facility-administered medications for this encounter.   Allergies:  Cephalosporins and Cefadroxil    Social History:      Social History     Socioeconomic History    Marital status:      Spouse name: Not on file    Number of children: Not on file    Years of education: Not on file    Highest education level: Not on file   Occupational History    Not on file   Tobacco Use    Smoking status: Former    Smokeless tobacco: Never   Vaping Use    Vaping Use: Never used   Substance and Sexual Activity    Alcohol use: No    Drug use: No    Sexual activity: Not Currently   Other Topics Concern    Not on file   Social History Narrative    Not on file     Social Determinants of Health Financial Resource Strain: Low Risk     Difficulty of Paying Living Expenses: Not hard at all   Food Insecurity: No Food Insecurity    Worried About Running Out of Food in the Last Year: Never true    Ran Out of Food in the Last Year: Never true   Transportation Needs: Not on file   Physical Activity: Insufficiently Active    Days of Exercise per Week: 3 days    Minutes of Exercise per Session: 20 min   Stress: Not on file   Social Connections: Not on file   Intimate Partner Violence: Not on file   Housing Stability: Not on file          Family History:         Problem Relation Age of Onset    No Known Problems Mother     No Known Problems Father      REVIEW OF SYSTEMS:    ROS per the HPI   Otherwise  10 point ROS negative     PHYSICAL EXAM:      Vitals:  /62   Pulse 77   Temp 97.9 °F (36.6 °C) (Oral)   Resp 14   Ht 6' 1\" (1.854 m)   Wt 151 lb 0.2 oz (68.5 kg)   SpO2 93%   BMI 19.92 kg/m²     CONSTITUTIONAL:  awake, alert, cooperative, no weak e NAD  EYES:  pupils equal, round and reactive to light, extra ocular muscles intact, sclera clear, conjunctiva normal  NECK:  Supple, symmetrical, trachea midline, no adenopathy, thyroid symmetric, not enlarged and no tenderness, skin normal  HEMATOLOGIC/LYMPHATICS:  no cervical lymphadenopathy,   LUNGS:  No increased work of breathing, good air exchange, clear to auscultation bilaterally, no crackles or wheezing  CARDIOVASCULAR:  , regular rate and rhythm, normal S1 and S2, no S3 or S4, and no murmur noted  ABDOMEN:  No scars, normal bowel sounds, soft, non-distended, non-tender, no masses palpated, no hepatosplenomegally      MUSCULOSKELETAL:  There is no redness, warmth, or swelling of the joints. Full range of motion noted. Motor strength is 5 out of 5 all extremities bilaterally. NEUROLOGIC:  Awake, alert, oriented to name, place and time. DATA:    PT/INR:    Recent Labs     11/28/22  1210   PROT 5.5*     PTT:  No results for input(s):  APTT in the last 72 hours. CMP:    Lab Results   Component Value Date/Time     11/28/2022 12:10 PM    K 4.3 11/28/2022 12:10 PM    CL 92 11/28/2022 12:10 PM    CO2 22 11/28/2022 12:10 PM    BUN 21 11/28/2022 12:10 PM    PROT 5.5 11/28/2022 12:10 PM    PROT 7.1 08/05/2011 12:00 AM     Magnesium:    Lab Results   Component Value Date/Time    MG 1.80 10/08/2018 10:20 AM     Phosphorus:  No components found for: PO4  Calcium:  No results found for: CA  CBC:    Lab Results   Component Value Date/Time    WBC 0.8 11/28/2022 12:10 PM    RBC 1.49 11/28/2022 12:10 PM    HGB 5.9 11/28/2022 12:10 PM    HCT 16.7 11/28/2022 12:10 PM    .9 11/28/2022 12:10 PM    RDW 18.4 11/28/2022 12:10 PM    PLT 38 11/28/2022 12:10 PM     DIFF:    Lab Results   Component Value Date/Time    .9 11/28/2022 12:10 PM    RDW 18.4 11/28/2022 12:10 PM      LDH:  @labcrnt(LDH)@  Uric Acid:  @labcrnt(URIC)@    Radiology Review: CT HEAD WO CONTRAST    Result Date: 11/28/2022  EXAMINATION: CT OF THE HEAD WITHOUT CONTRAST  11/28/2022 1:44 pm TECHNIQUE: CT of the head was performed without the administration of intravenous contrast. Automated exposure control, iterative reconstruction, and/or weight based adjustment of the mA/kV was utilized to reduce the radiation dose to as low as reasonably achievable. COMPARISON: Head CT dated 11/01/2022 HISTORY: ORDERING SYSTEM PROVIDED HISTORY: fall injury low platelets TECHNOLOGIST PROVIDED HISTORY: Reason for exam:->fall injury low platelets Has a \"code stroke\" or \"stroke alert\" been called? ->No Decision Support Exception - unselect if not a suspected or confirmed emergency medical condition->Emergency Medical Condition (MA) Reason for Exam: pt fell hit head FINDINGS: BRAIN/VENTRICLES: There is no acute intracranial hemorrhage, mass effect or midline shift. No abnormal extra-axial fluid collection. The gray-white differentiation is maintained without evidence of an acute infarct.  There is prominence of the ventricles and sulci due to global parenchymal volume loss. There are nonspecific areas of hypoattenuation within the periventricular and subcortical white matter, which likely represent chronic microvascular ischemic change. ORBITS: The visualized portion of the orbits demonstrate no acute abnormality. SINUSES: The visualized paranasal sinuses and mastoid air cells demonstrate no acute abnormality. SOFT TISSUES/SKULL: No acute abnormality of the visualized skull or soft tissues. No acute intracranial abnormality. CT Head W/O Contrast    Result Date: 11/4/2022  EXAMINATION: CT OF THE HEAD WITHOUT CONTRAST  11/1/2022 8:42 am TECHNIQUE: CT of the head was performed without the administration of intravenous contrast. Automated exposure control, iterative reconstruction, and/or weight based adjustment of the mA/kV was utilized to reduce the radiation dose to as low as reasonably achievable. COMPARISON: 10/07/2018 HISTORY: ORDERING SYSTEM PROVIDED HISTORY: Dizziness, light headedness TECHNOLOGIST PROVIDED HISTORY: Reason for exam:->Dizziness, light headedness Has a \"code stroke\" or \"stroke alert\" been called? ->No Decision Support Exception - unselect if not a suspected or confirmed emergency medical condition->Emergency Medical Condition (MA) Reason for Exam: Dizziness, light headedness FINDINGS: BRAIN/VENTRICLES: There is significant degradation of image quality related to motion artifact. Prominence of the ventricular system is unchanged. No evidence of mass effect or midline shift. Prominence of sulci overlying convexities of cerebral hemispheres and cerebellum again identified consistent with atrophy. Foci of abnormal low-attenuation again identified in periventricular/subcortical white matter. Finding likely on the basis of changes of mild chronic ischemic leukoencephalopathy. Faint bilateral basal ganglia calcification is identified. No abnormal extra-axial fluid collection is identified.   There is atherosclerotic calcification of distal internal carotid and vertebral arteries. ORBITS: The visualized portion of the orbits demonstrate no acute abnormality. Patient is status post bilateral cataract surgery. SINUSES: The visualized paranasal sinuses and mastoid air cells demonstrate no acute abnormality. SOFT TISSUES/SKULL:  No acute abnormality of the visualized skull or soft tissues. No evidence of acute intracranial abnormality. CT CERVICAL SPINE WO CONTRAST    Result Date: 11/28/2022  EXAMINATION: CT OF THE CERVICAL SPINE WITHOUT CONTRAST 11/28/2022 1:45 pm TECHNIQUE: CT of the cervical spine was performed without the administration of intravenous contrast. Multiplanar reformatted images are provided for review. Automated exposure control, iterative reconstruction, and/or weight based adjustment of the mA/kV was utilized to reduce the radiation dose to as low as reasonably achievable. COMPARISON: None. HISTORY: ORDERING SYSTEM PROVIDED HISTORY: fall neck pain Decision Support Exception - unselect if not a suspected or confirmed emergency medical condition->Emergency Medical Condition (MA) Reason for Exam: pt fell FINDINGS: BONES/ALIGNMENT: There is no acute fracture or traumatic malalignment. Bone mineralization appears abnormally low. DEGENERATIVE CHANGES: Diffuse moderate degenerative changes. Posterior disc osteophyte complex narrows the cervical spinal canal to measurement of 8 mm at C3-4, C4-5 and C5-6. Mild bilateral neural foraminal narrowing results from uncovertebral and facet arthrosis mid and lower cervical spine, most severely on the right at C6-7. SOFT TISSUES: There is no prevertebral soft tissue swelling. Calcifications involving bilateral carotid vasculature reflect calcific atherosclerosis. 1. No acute cervical fracture or listhesis. 2. Moderate severity degenerative disc disease and spondylosis throughout the cervical spine.  3. Mild-to-moderate acquired cervical spinal canal stenosis C3-4, C4-5 and C5-6. 4. Bilateral neural foraminal narrowing mild-to-moderate severity predominates on the right at C6-7. XR CHEST PORTABLE    Result Date: 11/1/2022  EXAMINATION: ONE XRAY VIEW OF THE CHEST 11/1/2022 8:27 am COMPARISON: 10/07/2018 HISTORY: ORDERING SYSTEM PROVIDED HISTORY: Dizziness TECHNOLOGIST PROVIDED HISTORY: Reason for exam:->Dizziness FINDINGS: The patient is rotated. The cardiac silhouette, mediastinal and hilar contours are normal.  The lungs are clear. There are no pleural effusions. No acute osseous abnormalities are identified. No acute cardiopulmonary disease. CT CHEST ABDOMEN PELVIS W CONTRAST Additional Contrast? None    Result Date: 11/28/2022  EXAMINATION: CT OF THE CHEST, ABDOMEN, AND PELVIS WITH CONTRAST 11/28/2022 1:46 pm TECHNIQUE: CT of the chest, abdomen and pelvis was performed with the administration of intravenous contrast. Multiplanar reformatted images are provided for review. Automated exposure control, iterative reconstruction, and/or weight based adjustment of the mA/kV was utilized to reduce the radiation dose to as low as reasonably achievable. COMPARISON: Chest CT 10/07/2018 HISTORY: ORDERING SYSTEM PROVIDED HISTORY: fall, back pain, low platelets hypotensive Decision Support Exception - unselect if not a suspected or confirmed emergency medical condition->Emergency Medical Condition (MA) Reason for Exam: fall back pain low platelets hypotensive FINDINGS: Chest: Mediastinum: Small hiatal hernia. Cardiac structures and great vessels appear unremarkable with exception of calcific atherosclerotic disease, mild-to-moderate calcific atherosclerosis coronary arteries. No pericardial effusion. Posterior mediastinal structures appear unremarkable. No mediastinal adenopathy. Right hilar lymph node measures 1.3 cm (axial series 5, image 64). Lungs/pleura: Mild dependent edema/atelectasis posterior mid and lower lungs.  Senescent pulmonary changes. No dense consolidation, pleural effusion or pneumothorax. No soft tissue pulmonary nodule. No ground-glass pulmonary nodule. No inspissated secretions or endobronchial lesion evident. Densely calcified granuloma is 8 mm posterolateral left lower lobe (benign finding requiring no additional evaluation or follow-up). Soft Tissues/Bones: No acute superficial soft tissue or osseous structure abnormality evident. No hematoma formation evident. Abdomen/Pelvis: Organs: Calcifications liver and spleen reflect sequela of old granulomatous disease (benign finding requiring no additional evaluation or follow-up) liver and spleen appear otherwise unremarkable. The gallbladder, pancreas, adrenal glands and kidneys appear unremarkable. GI/Bowel: Small hiatal hernia. The stomach and small bowel appear unremarkable. No diffuse or focal small bowel wall thickening or inflammatory changes evident. No obstruction is seen. The appendix is absent following appendectomy. There are a few scattered colonic diverticula without definite inflammatory changes associated. The colon appears otherwise unremarkable. Pelvis: Vascular calcifications are noted reflecting calcific atherosclerosis. No pneumoperitoneum. Prostate gland is enlarged. The seminal vesicles appear unremarkable. Urinary bladder is partially filled, unremarkable appearance. No adenopathy or free fluid. Peritoneum/Retroperitoneum: Indeterminate 1.6 x 2.2 cm natalie hepatis lymph node (axial series 8, image 60). A few shotty gastrohepatic ligament lymph nodes are demonstrated. No other adenopathy is seen in the abdomen. Calcific atherosclerotic disease aorta. No aorta aneurysm. Inferior vena cava appears unremarkable. No ascites or pneumoperitoneum. Bones/Soft Tissues: Slight degenerative anterolisthesis L4 on L5. No acute osseous abnormality demonstrated. No superficial soft tissue contusion is seen. No hematoma formation evident.      1.  CT CHEST: No acute traumatic abnormality. 2. Nonspecific mild right hilar lymph node enlargement is likely reactive. Consider IV contrast-enhanced chest CT at 6 months to ensure stability. 3. No acute pulmonary disease. 4. Mild-to-moderate calcific atherosclerosis coronary arteries. 5. CT ABDOMEN/PELVIS: No acute traumatic abnormality. 6. Nonspecific natalie hepatis lymph node without other adenopathy in the abdomen/pelvis is very likely reactive. Consider follow-up with IV contrast-enhanced CT abdomen at 6 months to ensure stability. 7. Mild diverticulosis coli without CT evidence of acute diverticulitis. 8. No other CT evidence of an acute intra-abdominal or intrapelvic process. 9.  Vascular calcifications are noted reflecting calcific atherosclerosis.          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    Scleral hemorrhage of right eye    Encounter to establish care    Pancytopenia ([facility] [address]    Anemia       IMPRESSION/RECOMMENDATIONS:  HIgh risk mds  Marrow at diagnosis did not show increased blasts but he had a complex karyotype and 3 lines down -making his prognosis poor  At diagnosis he was offered supportive care-transfusions epo,neuopgen vs vidaza-givne this though -  H ehas mds that is aggressive   Supportive care with neuopgen and epo have been given a short while without much impact  He doesnot want chemo ie vidaza-as it is hard to promise benefit-it can help 40% of pts but it takes 4-6 months of therapy to see a difference and in that time its common for pts to be at higher transfusion need and infection while the waiting for response occurs -  He is not interested in this   He said to let him be  Therefore he is hospice appropriate  I discussed at length with pt and wife and they are agreeable -consult placed

## 2022-11-30 VITALS
HEART RATE: 77 BPM | DIASTOLIC BLOOD PRESSURE: 62 MMHG | SYSTOLIC BLOOD PRESSURE: 114 MMHG | RESPIRATION RATE: 14 BRPM | BODY MASS INDEX: 20.01 KG/M2 | WEIGHT: 151.01 LBS | HEIGHT: 73 IN | OXYGEN SATURATION: 93 % | TEMPERATURE: 97.9 F

## 2022-11-30 PROCEDURE — 2580000003 HC RX 258: Performed by: STUDENT IN AN ORGANIZED HEALTH CARE EDUCATION/TRAINING PROGRAM

## 2022-11-30 PROCEDURE — 99232 SBSQ HOSP IP/OBS MODERATE 35: CPT | Performed by: STUDENT IN AN ORGANIZED HEALTH CARE EDUCATION/TRAINING PROGRAM

## 2022-11-30 PROCEDURE — 94760 N-INVAS EAR/PLS OXIMETRY 1: CPT

## 2022-11-30 PROCEDURE — 6370000000 HC RX 637 (ALT 250 FOR IP): Performed by: STUDENT IN AN ORGANIZED HEALTH CARE EDUCATION/TRAINING PROGRAM

## 2022-11-30 RX ADMIN — TAMSULOSIN HYDROCHLORIDE 0.4 MG: 0.4 CAPSULE ORAL at 07:39

## 2022-11-30 RX ADMIN — Medication 10 ML: at 07:39

## 2022-11-30 RX ADMIN — ESCITALOPRAM OXALATE 5 MG: 10 TABLET ORAL at 07:39

## 2022-11-30 RX ADMIN — PANTOPRAZOLE SODIUM 40 MG: 40 TABLET, DELAYED RELEASE ORAL at 07:39

## 2022-11-30 NOTE — CARE COORDINATION
CASE MANAGEMENT DISCHARGE SUMMARY:    DISCHARGE DATE: 11/30/22    DISCHARGED TO: Home with [facility] of [address] [facility], [facility], [address] [place] [address].           TRANSPORTATION: [facility]             TIME: noon     #[phone]  Electronically signed by Mart Spatz, RN on 11/30/2022 at 9:02 AM

## 2022-11-30 NOTE — PROGRESS NOTES
Name: Army Dickerson  /Age/Sex: 1933 (80 y.o. male)   MRN & CSN: [id number] & [id number]  Admission Date/Time: 2022 11:51 AM   Location: [id number]  Current Hospital Day: Hospital Day: 3   Principal Problem: Pancytopenia ([address]  HPI     Patient seen and examined. No issues overnight. Patient feels fatigued today. Appetite is poor. To go home with hospice today. All other review of systems negative unless noted above. VITALS     Vitals:    22 0730   BP: 114/62   Pulse: 77   Resp: 14   Temp: 97.9 °F (36.6 °C)   SpO2: 93%         PHYSICAL EXAM      Constitutional:       General: He is not in acute distress. Appearance: Normal appearance. HENT:      Head: Normocephalic. Dry oral mucosa  Eyes:      Extraocular Movements: Extraocular movements intact. Conjunctiva/sclera: Conjunctivae normal.   Cardiovascular:      Heart sounds: Regular rate and rhythm, no murmurs poor skin turgor  Pulmonary:      Effort: Pulmonary effort is normal.   Musculoskeletal:      Right lower leg: No edema. Left lower leg: No edema. Skin:     General: Skin is warm and dry. Neurological:      General: No focal deficit present. Mental Status: He is alert and oriented to person, place, and time. Mental status is at baseline.    Psychiatric:         Mood and Affect: Mood normal.        LABS   BMP  Recent Labs     22  1210   *   K 4.3   CL 92*   CO2 22   BUN 21*   CREATININE 1.0   CALCIUM 8.0*     CBC/COAGS  Recent Labs     22  1210   WBC 0.8*   HCT 16.7*   PLT 38*     Liver & Pancreas  Recent Labs     22  1210   AST 34   ALT 30   ALKPHOS 64          IMAGING   No new imaging   Above studies and images were personally reviewed by myself    MEDS   Scheduled Meds:   escitalopram  5 mg Oral Daily    pantoprazole  40 mg Oral QAM AC    tamsulosin  0.4 mg Oral Daily    sodium chloride flush  5-40 mL IntraVENous 2 times per day     Continuous Infusions:   sodium chloride Stopped (11/28/22 9599)    sodium chloride       PRN Meds:HYDROcodone-acetaminophen, LORazepam, sodium chloride flush, sodium chloride, ondansetron **OR** ondansetron, polyethylene glycol, acetaminophen **OR** acetaminophen     CURRENT HOSPITAL PROBLEMS   Principal Problem:    [facility]  Active Problems:    Anemia    Hyperlipidemia    Anxiety disorder    HTN (hypertension)  Resolved Problems:    * No resolved hospital problems. *      Edy Guadalupe is a 80 y.o. male who was admitted with Pancytopenia ([facility] [address] Patient admitted with progressive pancytopenia in the setting of MDS. He was transfused 2 units this admission and had does have some symptomatic improvement. He has progressive disease and does not elect to pursue chemotherapy at this time. Will continue medications for symptom control including Norco and benzodiazepine. Can also continue PPI.     Home with hospice today    Abhay Parker MD 11/30/2022 8:05 AM

## 2022-11-30 NOTE — PLAN OF CARE
Problem: Safety - Adult  Goal: Free from fall injury  Outcome: Progressing  Flowsheets (Taken 11/30/2022 0100)  Free From Fall Injury: Instruct family/caregiver on patient safety     Problem: Respiratory - Adult  Goal: Achieves optimal ventilation and oxygenation  Outcome: Progressing  Flowsheets (Taken 11/30/2022 0100)  Achieves optimal ventilation and oxygenation:   Assess for changes in respiratory status   Assess for changes in mentation and behavior   Position to facilitate oxygenation and minimize respiratory effort     Problem: Metabolic/Fluid and Electrolytes - Adult  Goal: Electrolytes maintained within normal limits  Outcome: Progressing  Flowsheets (Taken 11/30/2022 0100)  Electrolytes maintained within normal limits:   Monitor labs and assess patient for signs and symptoms of electrolyte imbalances   Administer electrolyte replacement as ordered     Problem: Pain  Goal: Verbalizes/displays adequate comfort level or baseline comfort level  Outcome: Progressing

## 2022-11-30 NOTE — PROGRESS NOTES
D/c order received, pt verbalized agreement to d/c home with hospice. D/c instructions prepared and given to pt and pt's wife, both VU. Medication information packet given r/t NEW and/or CHANGED prescriptions, both VU. Pt belongings gathered, IV removed without complication. Disposition is home with hospice, transported with [facility] via stretcher.      Electronically signed by Damian Gordon RN on 11/30/2022 at 12:15 PM

## 2022-11-30 NOTE — DISCHARGE INSTR - COC
Continuity of Care Form    Patient Name: Edy Guadalupe   :  1933  MRN:  [id number]    Admit date:  2022  Discharge date:  ***    Code Status Order: DNR-CC   Advance Directives:     Admitting Physician:  Abhay Parker MD  PCP: Abhay Parker MD    Discharging Nurse: [facility] Unit/Room#: [id number]/7132-55  Discharging Unit Phone Number: ***    Emergency Contact:   Extended Emergency Contact Information  Primary Emergency Contact: Sandra Castorena  Address: [address]  Phone: [phone]  Relation: Spouse  Secondary Emergency Contact: Dhara Zamudio, [address] [facility] Phone: [phone]  Mobile Phone: [phone]  Relation: Child    Past Surgical History:  Past Surgical History:   Procedure Laterality Date    APPENDECTOMY      COLONOSCOPY  2020    COLONOSCOPY N/A 11/3/2020    COLONOSCOPY with ABLATION performed by Eduardo Silva MD at [address] [facility]  11/3/2020    COLONOSCOPY WITH BIOPSY performed by Eduardo Silva MD at [address] [facility] N/A 2021    COLONOSCOPY POLYPECTOMY SNARE/COLD BIOPSY performed by Eduardo Silva MD at [address]         Immunization History:   Immunization History   Administered Date(s) Administered    COVID-19, MODERNA BLUE border, Primary or Immunocompromised, (age 12y+), IM, 100 mcg/0.5mL 2021, 2021    COVID-19, MODERNA Bivalent BOOSTER, (age 12y+), IM, 50 mcg/0.5 mL 2022    COVID-19, MODERNA Booster BLUE border, (age 18y+), IM, 50mcg/0.25mL 2021, 2022    Influenza A (U2I3-91) Vaccine PF IM 2009    Influenza Vaccine, unspecified formulation 2015, 2016    Influenza, FLUAD, (age 72 y+), Adjuvanted, 0.5mL 2021, 2022    Influenza, High Dose (Fluzone 65 yrs and older) 2015, 2016, 2017, 2018, 09/10/2020    Influenza, Triv, inactivated, subunit, adjuvanted, IM (Fluad 65 yrs and older) 09/17/2019    Pneumococcal Conjugate 13-valent (Lenvskq54) 07/17/2015    Pneumococcal Polysaccharide (Vsgjyaqtx04) 10/03/2013    Zoster Live (Zostavax) 06/13/2011       Active Problems:  Patient Active Problem List   Diagnosis Code    Hyperlipidemia E78.5    Anxiety disorder F41.9    HTN (hypertension) I10    Actinic keratosis L57.0    Benign neoplasm of other specified sites of skin D23.9    Blood in stool K92.1    Constipation K59.00    History of nonmelanoma skin cancer Z85.828    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    Scleral hemorrhage of right eye H11.31    Encounter to establish care Z76.89    Pancytopenia ([address] K09.301    Anemia D64.9       Isolation/Infection:   Isolation            No Isolation          Patient Infection Status       Infection Onset Added Last Indicated Last Indicated By Review Planned Expiration Resolved Resolved By    None active    Resolved    COVID-19 (Rule Out) 11/28/22 11/28/22 11/28/22 COVID-19, Rapid (Ordered)   11/28/22 Rule-Out Test Resulted    COVID-19 (Rule Out) 11/01/22 11/01/22 11/01/22 COVID-19, Rapid (Ordered)   11/01/22 Rule-Out Test Resulted            Nurse Assessment:  Last Vital Signs: /62   Pulse 77   Temp 97.9 °F (36.6 °C) (Oral)   Resp 14   Ht 6' 1\" (1.854 m)   Wt 151 lb 0.2 oz (68.5 kg)   SpO2 93%   BMI 19.92 kg/m²     Last documented pain score (0-10 scale): Pain Level: 0  Last Weight:   Wt Readings from Last 1 Encounters:   11/30/22 151 lb 0.2 oz (68.5 kg)     Mental Status:  {IP PT MENTAL STATUS:34196}    IV Access:  {[facility] IV ACCESS:253313662}    Nursing Mobility/ADLs:  Walking   {CHP DME BCLO:051302513}  Transfer  {CHP DME DMBX:840296483}  Bathing  {CHP DME BVXZ:888102188}  Dressing  {CHP DME XULR:504441387}  Toileting  {CHP DME BBJX:160229370}  Feeding  {CHP DME WOYW:751610553}  Med Admin  {OLI ORELLANA KTVT:212665656}  Med Delivery   {[facility] MED Delivery:052177222}    Wound Care Documentation and Therapy:        Elimination:  Continence: Bowel: {YES / UW:01146}  Bladder: {YES / WT:64930}  Urinary Catheter: {Urinary Catheter:594727834}   Colostomy/Ileostomy/Ileal Conduit: {YES / EW:98960}       Date of Last BM: ***    Intake/Output Summary (Last 24 hours) at 2022 0904  Last data filed at 2022 0401  Gross per 24 hour   Intake 460 ml   Output --   Net 460 ml     I/O last 3 completed shifts: In: 1573.3 [P.O.:610; Blood:608.3;  IV Piggyback:355]  Out: -     Safety Concerns:     [address] Safety Concerns:291512998}    Impairments/Disabilities:      [address] Impairments/Disabilities:686923560}    Nutrition Therapy:  Current Nutrition Therapy:   [address] Diet List:562741121}    Routes of Feeding: {CHP DME Other Feedings:817825945}  Liquids: {Slp liquid thickness:34263}  Daily Fluid Restriction: {CHP DME Yes amt example:676402730}  Last Modified Barium Swallow with Video (Video Swallowing Test): {Done Not Done PGAB:089546248}    Treatments at the Time of Hospital Discharge:   Respiratory Treatments: ***  Oxygen Therapy:  {Therapy; copd oxygen:05164}  Ventilator:    { CC Vent OCWW:821331247}    Rehab Therapies: {THERAPEUTIC INTERVENTION:1344943945}  Weight Bearing Status/Restrictions: [facility] Weight Bearin}  Other Medical Equipment (for information only, NOT a DME order):  {EQUIPMENT:123487830}  Other Treatments: ***    Patient's personal belongings (please select all that are sent with patient):  {CHP DME Belongings:413971849}    RN SIGNATURE:  {Esignature:147257203}    CASE MANAGEMENT/SOCIAL WORK SECTION    Inpatient Status Date: 22    Readmission Risk Assessment Score:  Readmission Risk              Risk of Unplanned Readmission:  17         Discharging to [facility]  [address], [place], [address] [address].    / signature: Electronically signed by Ida Halsted, RN on 22 at 9:05 AM EST    PHYSICIAN SECTION    Prognosis: {Prognosis:9137808096}    Condition at Discharge: [address] Patient Condition:095322587}    Rehab Potential (if transferring to Rehab): {Prognosis:5011192930}    Recommended Labs or Other Treatments After Discharge: ***    Physician Certification: I certify the above information and transfer of Mark Petersen  is necessary for the continuing treatment of the diagnosis listed and that he requires {Admit to Appropriate Level of Care:63136} for {GREATER/LESS:309838420} 30 days.      Update Admission H&P: {CHP DME Changes in WZQLN:711341512}    PHYSICIAN SIGNATURE:  {Esignature:103566717}

## 2022-11-30 NOTE — PROGRESS NOTES
Physician Progress Note      PATIENTMigutomasz [place]  CSN #:                  [id number]  :                       1933  ADMIT DATE:       2022 11:51 AM  [facility] DATE:  RESPONDING  PROVIDER #:        Mary Jane Thomas MD          QUERY TEXT:    Dear Dr. Liliana Vegas,  Pt admitted with Pancytopenia and MDS. Pt noted to have low sodium level=126. If possible, please document in the progress notes and discharge summary if   you are evaluating and / or treating any of the following: The medical record reflects the following:  Risk Factors: Hx recent diagnosis of MDS, recent abx at home, current weakness   and poor appetite, reported watery diarrhea for 2-3 days  Clinical Indicators:  Jh=123 and Cl=92  Treatment: IVF NS bolus 1L in ED  Thank you,  Oliverio Helm RN, [email or web address]  Options provided:  -- Hyponatremia  -- Clinically insignificant low serum sodium  -- Other - I will add my own diagnosis  -- Disagree - Not applicable / Not valid  -- Disagree - Clinically unable to determine / Unknown  -- Refer to Clinical Documentation Reviewer    PROVIDER RESPONSE TEXT:    This patient has hyponatremia. Query created by:  Jose Roberto Noland on 2022 1:21 PM      Electronically signed by:  Mary Jane Thomas MD 2022 8:05 AM

## 2022-12-01 ENCOUNTER — CARE COORDINATION (OUTPATIENT)
Dept: CASE MANAGEMENT | Age: 87
End: 2022-12-01

## 2022-12-01 NOTE — CARE COORDINATION
[address] Care Transitions Initial Follow Up Call    Call within 2 business days of discharge: Yes  Patient: John Xie Patient : 1933   MRN: [id number]  Reason for Admission: Pancytopenia  Discharge Date: 22 RARS: Readmission Risk Score: 17.4      Last Discharge  Street       Date Complaint Diagnosis Description Type Department Provider    22 Fatigue Pancytopenia ([facility] [address] . .. ED to Hosp-Admission (Discharged) (ADMIT) Warner Farrar MD; Alma Burton. .. Spoke with patients steve Apodaca. He stated he is okay. He is having his first visit with the hospice nurse. No further outreach scheduled.         Care Transitions 24 Hour Call    Do you have all of your prescriptions and are they filled?: Yes  Post Acute Services: Home Health (Comment[facility]  Do you have support at home?: Partner/Spouse/SO  Are you an active caregiver in your home?: No  Care Transitions Interventions         Follow Up  Future Appointments   Date Time Provider Katlin Miller   2023 11:00 AM Violeta Harper MD [address] [place] [address]

## 2022-12-02 LAB — CULTURE, BLOOD 2: NORMAL

## 2022-12-03 LAB — BLOOD CULTURE, ROUTINE: NORMAL

## 2022-12-15 NOTE — DISCHARGE SUMMARY
[facility] Internal Medicine Discharge Summary      Patient ID: Edy Guadalupe      Patient's PCP: Abhay Parker MD    Admit Date: 11/1/2022     Discharge Date: 11/2/2022  The patient was seen and examined on day of discharge and this discharge summary is in conjunction with any daily progress note from day of discharge. Admitting Physician: Abhay Parker MD    Discharge Physician: Abhay Parker MD     Admitted for   Chief Complaint   Patient presents with    Dizziness     No feeling well, started yesterday. Pt states he was dizzy in the shower and almost fell but was able to catch himself.        Admitting Diagnosis Lightheadedness [R42]  Near syncope [R55]  Pancytopenia [facility]) [Q97.196]    Discharge Diagnoses: Active Hospital Problems    Diagnosis Date Noted    Pancytopenia [facility]) [D61.818] 11/01/2022     Priority: Medium    Hyperlipidemia [E78.5] 08/22/2012    Anxiety disorder [F41.9] 08/22/2012       Follow Up: Primary Care Physician in one week    PCP to Follow up on   Echo results            Hospital Course:     Patient presents with poor appetite, recent diagnosis of MDS with severe neutropenia, macrocytic anemia and thrombocytopenia. He is following with hematology. He does not have any fevers. He has responded profoundly to a fluid bolus and maintenance fluids overnight. Plan  -Had IV with improvement of symptoms  Repeat echocardiogram.  Unremarkable telemetry. Stop metoprolol, even if this is benefiting PVCs he is susceptible to orthostasis. His blood pressure is elevated today, will monitor before starting any other agents. For his pancytopenia, MDS and severe neutropenia he is not having any fevers. Neutropenic precautions. He is due to follow with hematology outpatient for initiation of treatment. Dispo: Patient discharge after physical therapy evaluation echocardiogram.  He will need close follow-up with hematology outpatient.     Consults:     IP CONSULT TO PRIMARY CARE PROVIDER  IP CONSULT TO HOME CARE NEEDS        Disposition: home    Discharged Condition: Stable    Code Status: Prior    Activity: activity as tolerated    Diet: regular diet      Wound Care: none needed    SUBJECTIVE / Interval History:   No change from 11/2 H&P    Exam:  TEMPERATURE:  Current - Temp: 97.9 °F (36.6 °C); Max - No data recorded  RESPIRATIONS RANGE: No data recorded  PULSE RANGE: No data recorded  BLOOD PRESSURE RANGE:  No data recorded.  ; No data recorded. PULSE OXIMETRY RANGE: No data recorded  24HR INTAKE/OUTPUT:  No intake or output data in the 24 hours ending 12/15/22 1015   Wt Readings from Last 1 Encounters:   11/30/22 151 lb 0.2 oz (68.5 kg)     BMI Readings from Last 1 Encounters:   11/30/22 19.92 kg/m²   Physical Exam  Vitals reviewed. Constitutional:       General: He is not in acute distress. Appearance: Normal appearance. HENT:      Head: Normocephalic. Dry oral mucosa  Eyes:      Extraocular Movements: Extraocular movements intact. Conjunctiva/sclera: Conjunctivae normal.   Cardiovascular:      Heart sounds: Regular rate and rhythm, no murmurs poor skin turgor  Pulmonary:      Effort: Pulmonary effort is normal.   Musculoskeletal:      Right lower leg: No edema. Left lower leg: No edema. Skin:     General: Skin is warm and dry. Neurological:      General: No focal deficit present. Mental Status: He is alert and oriented to person, place, and time. Mental status is at baseline. Psychiatric:         Mood and Affect: Mood normal.          Labs:  For convenience and continuity at follow-up the following most recent labs are provided:    CBC:   Lab Results   Component Value Date/Time    WBC 0.8 11/28/2022 12:10 PM    HGB 5.9 11/28/2022 12:10 PM    HCT 16.7 11/28/2022 12:10 PM    PLT 38 11/28/2022 12:10 PM       RENAL:   Lab Results   Component Value Date/Time     11/28/2022 12:10 PM    K 4.3 11/28/2022 12:10 PM    CL 92 11/28/2022 12:10 PM CO2 22 11/28/2022 12:10 PM    BUN 21 11/28/2022 12:10 PM    CREATININE 1.0 11/28/2022 12:10 PM           Discharge Medications:      Medication List        CONTINUE taking these medications      aspirin 81 MG EC tablet     fluticasone 50 MCG/ACT nasal spray  Commonly known as: FLONASE  2 sprays by Each Nostril route daily     LORazepam 0.5 MG tablet  Commonly known as: ATIVAN     omeprazole 20 MG delayed release capsule  Commonly known as: PRILOSEC     polyethylene glycol 17 g packet  Commonly known as: GLYCOLAX     tamsulosin 0.4 MG capsule  Commonly known as: FLOMAX  Take 1 capsule by mouth daily            STOP taking these medications      metoprolol succinate 25 MG extended release tablet  Commonly known as: TOPROL XL     rosuvastatin 10 MG tablet  Commonly known as: CRESTOR              Future Appointments   Date Time Provider hospitals   2/20/2023 11:00 AM Eliseo Lezama MD [facility] - [facility]       Time Spent on discharge is more than 20 minutes in the examination, evaluation, counseling and review of medications and discharge plan. Signed:  Eliseo Lezama MD   12/15/2022    The note was completed using EMR. Every effort was made to ensure accuracy; however, inadvertent computerized transcription errors may be present.

## (undated) DEVICE — ENDOSCOPY KIT: Brand: MEDLINE INDUSTRIES, INC.

## (undated) DEVICE — ELECTRODE PT RET AD L9FT HI MOIST COND ADH HYDRGEL CORDED

## (undated) DEVICE — FORCEPS BX 240CM 2.4MM L NDL RAD JAW 4 M00513334

## (undated) DEVICE — FIAPC® PROBE W/ FILTER 2200 A OD 2.3MM/6.9FR; L 2.2M/7.2FT: Brand: ERBE

## (undated) DEVICE — CONTAINER SPEC 480ML CLR POLYSTYR 10% NEUT BUFF FRMLN ZN

## (undated) DEVICE — TRAP POLYP ETRAP

## (undated) DEVICE — SINGLE-USE POLYPECTOMY SNARE: Brand: CAPTIFLEX